# Patient Record
Sex: MALE | Race: WHITE | NOT HISPANIC OR LATINO | Employment: OTHER | URBAN - NONMETROPOLITAN AREA
[De-identification: names, ages, dates, MRNs, and addresses within clinical notes are randomized per-mention and may not be internally consistent; named-entity substitution may affect disease eponyms.]

---

## 2022-08-04 ENCOUNTER — OFFICE VISIT (OUTPATIENT)
Dept: FAMILY MEDICINE CLINIC | Facility: CLINIC | Age: 72
End: 2022-08-04

## 2022-08-04 VITALS
HEART RATE: 71 BPM | TEMPERATURE: 98 F | BODY MASS INDEX: 35.76 KG/M2 | RESPIRATION RATE: 15 BRPM | SYSTOLIC BLOOD PRESSURE: 130 MMHG | OXYGEN SATURATION: 98 % | HEIGHT: 70 IN | DIASTOLIC BLOOD PRESSURE: 72 MMHG | WEIGHT: 249.8 LBS

## 2022-08-04 DIAGNOSIS — Z85.51 HISTORY OF BLADDER CARCINOMA: ICD-10-CM

## 2022-08-04 DIAGNOSIS — Z12.5 PROSTATE CANCER SCREENING: ICD-10-CM

## 2022-08-04 DIAGNOSIS — E78.5 DYSLIPIDEMIA: Chronic | ICD-10-CM

## 2022-08-04 DIAGNOSIS — Z00.00 INITIAL MEDICARE ANNUAL WELLNESS VISIT: Primary | ICD-10-CM

## 2022-08-04 DIAGNOSIS — I10 ESSENTIAL HYPERTENSION: Chronic | ICD-10-CM

## 2022-08-04 PROBLEM — M75.41 IMPINGEMENT SYNDROME OF RIGHT SHOULDER: Status: ACTIVE | Noted: 2022-08-04

## 2022-08-04 PROCEDURE — 1159F MED LIST DOCD IN RCRD: CPT | Performed by: FAMILY MEDICINE

## 2022-08-04 PROCEDURE — 1170F FXNL STATUS ASSESSED: CPT | Performed by: FAMILY MEDICINE

## 2022-08-04 PROCEDURE — 99203 OFFICE O/P NEW LOW 30 MIN: CPT | Performed by: FAMILY MEDICINE

## 2022-08-04 RX ORDER — METOPROLOL SUCCINATE 25 MG/1
TABLET, EXTENDED RELEASE ORAL
COMMUNITY
End: 2022-08-04 | Stop reason: SDUPTHER

## 2022-08-04 RX ORDER — ASPIRIN 81 MG/1
TABLET ORAL
COMMUNITY

## 2022-08-04 RX ORDER — LOSARTAN POTASSIUM 25 MG/1
25 TABLET ORAL DAILY
Qty: 90 TABLET | Refills: 3 | Status: SHIPPED | OUTPATIENT
Start: 2022-08-04

## 2022-08-04 RX ORDER — METOPROLOL SUCCINATE 25 MG/1
25 TABLET, EXTENDED RELEASE ORAL DAILY
Qty: 90 TABLET | Refills: 3 | Status: SHIPPED | OUTPATIENT
Start: 2022-08-04

## 2022-08-04 RX ORDER — SULFAMETHOXAZOLE AND TRIMETHOPRIM 800; 160 MG/1; MG/1
TABLET ORAL
COMMUNITY
Start: 2022-07-19 | End: 2022-08-04

## 2022-08-04 RX ORDER — SIMVASTATIN 10 MG
TABLET ORAL
COMMUNITY
Start: 2012-04-01 | End: 2022-08-04 | Stop reason: SDUPTHER

## 2022-08-04 RX ORDER — SIMVASTATIN 10 MG
10 TABLET ORAL NIGHTLY
Qty: 90 TABLET | Refills: 3 | Status: SHIPPED | OUTPATIENT
Start: 2022-08-04

## 2022-08-04 RX ORDER — LOSARTAN POTASSIUM 25 MG/1
TABLET ORAL
COMMUNITY
End: 2022-08-04 | Stop reason: SDUPTHER

## 2022-08-04 NOTE — PROGRESS NOTES
The ABCs of the Annual Wellness Visit  Initial Medicare Wellness Visit    Chief Complaint   Patient presents with   • Establish Care   • Medicare Wellness-Initial Visit          • Hyperlipidemia   • Hypertension     Subjective   History of Present Illness:  Adolfo Suazo Jr. is a 71 y.o. male who presents for an Initial Medicare Wellness Visit.    Patient also here in request for establishing with a new primary care provider for management of numerous chronic comorbid conditions, including hypertension, dyslipidemia and history of bladder carcinoma.    Patient has lived in [place] for the last several years, chose to live there independently of work.  He is retired.  He is elected to move  to home secondary to be closer to his family, he has a brother that lives in [facility].    Patient reports undergoing bladder cancer surgical intervention, last evaluation approximately 6 months ago.  He denies any hematuria or new urinary symptoms.  He denies fever, chills or night sweats.  He maintains an active lifestyle, balanced dietary intake.  He reports good hydration habits.  He denies chest pain, syncope, palpitations or vertigo.  Denies problems with his current medication regimen.  He has developed a cough with use of ACE inhibitor's in the past.  He has done well with current dosing of losartan.    Patient states he has been just over a year since his last formal laboratory evaluation, perhaps as long as 1-1/2 years.  He is interested in establishing with a local urologist for continued monitoring.    The following portions of the patient's history were reviewed and   updated as appropriate: allergies, current medications, past family history, past medical history, past social history, past surgical history and problem list.     Compared to one year ago, the patient feels his physical   health is the same.    Compared to one year ago, the patient feels his mental   health is the  same.    Recent Hospitalizations:  He was not admitted to the hospital during the last year.       Current Medical Providers:  Patient Care Team:  Sachin Edwards DO as PCP - General (Family Medicine)    Outpatient Medications Prior to Visit   Medication Sig Dispense Refill   • aspirin 81 MG EC tablet aspirin 81 mg tablet,delayed release   Take 1 tablet every day by oral route as directed.     • influenza vac split high-dose (FLUZONE HIGH DOSE) 0.5 ML suspension prefilled syringe injection Fluzone High-Dose 7720-5965 (PF) 180 mcg/0.5 mL intramuscular syringe     • losartan (COZAAR) 25 MG tablet losartan 25 mg tablet   TAKE 1 TABLET BY MOUTH ONCE DAILY     • LOSARTAN POTASSIUM-HCTZ PO      • metoprolol succinate XL (TOPROL-XL) 25 MG 24 hr tablet metoprolol succinate ER 25 mg tablet,extended release 24 hr   TAKE 1 TABLET BY MOUTH ONCE DAILY AT 10 PM     • mupirocin (BACTROBAN) 2 % ointment      • simvastatin (ZOCOR) 10 MG tablet simvastatin 10 mg tablet   TAKE 1 TABLET BY MOUTH ONCE DAILY AT 10 PM     • sulfamethoxazole-trimethoprim (BACTRIM DS,SEPTRA DS) 800-160 MG per tablet        No facility-administered medications prior to visit.       No opioid medication identified on active medication list. I have reviewed chart for other potential  high risk medication/s and harmful drug interactions in the elderly.          Aspirin is on active medication list. Aspirin use is indicated based on review of current medical condition/s. Pros and cons of this therapy have been discussed today. Benefits of this medication outweigh potential harm.  Patient has been encouraged to continue taking this medication.  .      Patient Active Problem List   Diagnosis   • Essential hypertension   • Dyslipidemia   • History of bladder carcinoma   • Impingement syndrome of right shoulder     Advance Care Planning  Advance Directive is not on file.  ACP discussion was held with the patient during this visit. Patient does not have an advance  "directive, information provided.    Review of systems:    1. Constitutional: Negative for fever. Negative for chills, diaphoresis, fatigue and unexpected weight change.   2. HENT: No dysphagia; no changes to vision/hearing/smell/taste; no epistaxis  3. Eyes: Negative for redness and visual disturbance.   4. Respiratory: negative for shortness of breath. Negative for chest pain . Negative for cough and chest tightness.   5. Cardiovascular: Negative for chest pain and palpitations.   6. Gastrointestinal: Negative for abdominal distention, abdominal pain and blood in stool.   7. Endocrine: Negative for cold intolerance and heat intolerance.   8. Genitourinary: Negative for difficulty urinating, dysuria and frequency.   9. Musculoskeletal: Negative for arthralgias, back pain and myalgias.   10. Skin: Negative for color change, rash and wound.   11. Neurological: Negative for syncope, weakness and headaches.   12. Hematological: Negative for adenopathy. Does not bruise/bleed easily.   13. Psychiatric/Behavioral: Negative for confusion. The patient is not nervous/anxious.           Objective       Vitals:    08/04/22 0952   BP: 130/72   Pulse: 71   Resp: 15   Temp: 98 °F (36.7 °C)   SpO2: 98%   Weight: 113 kg (249 lb 12.8 oz)   Height: 177.8 cm (70\")     Estimated body mass index is 35.84 kg/m² as calculated from the following:    Height as of this encounter: 177.8 cm (70\").    Weight as of this encounter: 113 kg (249 lb 12.8 oz).    Class 2 Severe Obesity (BMI >=35 and <=39.9). Obesity-related health conditions include the following: hypertension and dyslipidemias. Obesity is newly identified. BMI is is above average; BMI management plan is completed. We discussed low calorie, low carb based diet program, portion control and increasing exercise.      Does the patient have evidence of cognitive impairment? No    Physical Exam        General Appearance: alert, oriented x 3, no acute distress.  Skin: warm and dry.   HEENT: " Atraumatic.  pupils round and reactive to light and accommodation, oral mucosa pink and moist.  Nares patent without epistaxis.  External auditory canals are patent tympanic membranes intact.  Neck: supple, no JVD, trachea midline.  No thyromegaly  Lungs: CTA, unlabored breathing effort.  Heart: RRR, normal S1 and S2, no S3, no rub.  Abdomen: soft, non-tender, no palpable bladder, present bowel sounds to auscultation ×4.  No guarding or rigidity.  Extremities: no clubbing, cyanosis or edema.  Good range of motion actively and passively.  Symmetric muscle strength and development  Neuro: normal speech and mental status.  Cranial nerves II through XII intact.  No anosmia. DTR 2+; proprioception intact.  No focal motor/sensory deficits.    HEALTH RISK ASSESSMENT    Smoking Status:  Social History     Tobacco Use   Smoking Status Former Smoker   • Packs/day: 1.00   • Years: 17.00   • Pack years: 17.00   • Start date: 1965   • Quit date: 1982   • Years since quittin.9   Smokeless Tobacco Never Used     Alcohol Consumption:  Social History     Substance and Sexual Activity   Alcohol Use Defer     Fall Risk Screen:    STEADI Fall Risk Assessment was completed, and patient is at LOW risk for falls.Assessment completed on:2022    Depression Screen:   PHQ-2/PHQ-9 Depression Screening 2022   Little Interest or Pleasure in Doing Things 0-->not at all   Feeling Down, Depressed or Hopeless 0-->not at all   PHQ-9: Brief Depression Severity Measure Score 0       Health Habits and Functional and Cognitive Screening:  No flowsheet data found.    Age-appropriate Screening Schedule:  Refer to the list below for future screening recommendations based on patient's age, sex and/or medical conditions. Orders for these recommended tests are listed in the plan section. The patient has been provided with a written plan.    Health Maintenance   Topic Date Due   • ZOSTER VACCINE (2 of 2) 2019   • INFLUENZA VACCINE   10/01/2022   • TDAP/TD VACCINES (2 - Td or Tdap) 04/20/2028            Assessment & Plan   CMS Preventative Services Quick Reference  Risk Factors Identified During Encounter  Cardiovascular Disease  Inactivity/Sedentary  Obesity/Overweight   The above risks/problems have been discussed with the patient.  Follow up actions/plans if indicated are seen below in the Assessment/Plan Section.  Pertinent information has been shared with the patient in the After Visit Summary.    Diagnoses and all orders for this visit:    1. Initial Medicare annual wellness visit (Primary)  -     Comprehensive Metabolic Panel  -     CBC & Differential    2. Essential hypertension  -     TSH  -     T4, Free  -     Lipid Panel  -     Comprehensive Metabolic Panel  -     CBC & Differential  -     losartan (COZAAR) 25 MG tablet; Take 1 tablet by mouth Daily.  Dispense: 90 tablet; Refill: 3  -     metoprolol succinate XL (TOPROL-XL) 25 MG 24 hr tablet; Take 1 tablet by mouth Daily.  Dispense: 90 tablet; Refill: 3    3. Dyslipidemia  -     TSH  -     T4, Free  -     Lipid Panel  -     Comprehensive Metabolic Panel  -     simvastatin (ZOCOR) 10 MG tablet; Take 1 tablet by mouth Every Night.  Dispense: 90 tablet; Refill: 3    4. Prostate cancer screening  -     PSA SCREENING    5. History of bladder carcinoma  -     Ambulatory Referral to Urology        Follow Up:  Return in about 6 months (around 2/4/2023) for Recheck.     An After Visit Summary and PPPS were made available to the patient.    I have discussed age/gender specific preventative healthcare issues in detail with patient today.  I have answered all of the questions.    Discussed need for reduction in sodium/salt/caffeine intake; improve sleep habits as able; inc formal CV exercise program with goal of vigorous activity most, if not all, days of the week; goal of 250 min of sustained HR CV exercise total per week.    Referral order has been placed to establish with a new urologist.  He  is relatively asymptomatic at this time.    Surveillance labs today including CMP/CBC/lipid panel/thyroid studies/PSA.    I have reviewed results of most recent colonoscopy, they have been scanned into his chart.  His recommended interval will be 10 years.    I have refilled needed medications today including his losartan, simvastatin and metoprolol.  Continue daily aspirin therapy.    I spent 55 minutes caring for Adolfo on this date of service; this includes 25-minute spent in management of his Medicare wellness issues/concerns, as well as an additional 30 minutes spent in evaluation/management of his hypertension, dyslipidemia, referral for urology in lieu of his bladder carcinoma history. This time includes time spent by me in the following activities:preparing for the visit, performing a medically appropriate examination and/or evaluation , counseling and educating the patient/family/caregiver, ordering medications, tests, or procedures, documenting information in the medical record and care coordination

## 2022-08-04 NOTE — PATIENT INSTRUCTIONS
Medicare Wellness  Personal Prevention Plan of Service     Date of Office Visit:    Encounter Provider:  Sachin Edwards DO  Place of Service:  [facility]  Patient Name: Adolfo Suazo Jr.  :  1950    As part of the Medicare Wellness portion of your visit today, we are providing you with this personalized preventive plan of services (PPPS). This plan is based upon recommendations of the [place] Preventive Services Task Force (USPSTF) and the Advisory Committee on Immunization Practices (ACIP).    This lists the preventive care services that should be considered, and provides dates of when you are due. Items listed as completed are up-to-date and do not require any further intervention.    Health Maintenance   Topic Date Due   • COLORECTAL CANCER SCREENING  Never done   • COVID-19 Vaccine (1) Never done   • Pneumococcal Vaccine 65+ (1 - PCV) Never done   • ZOSTER VACCINE (2 of 2) 2019   • HEPATITIS C SCREENING  Never done   • ANNUAL WELLNESS VISIT  Never done   • AAA SCREEN (ONE-TIME)  Never done   • INFLUENZA VACCINE  10/01/2022   • TDAP/TD VACCINES (2 - Td or Tdap) 2028       No orders of the defined types were placed in this encounter.      No follow-ups on file.

## 2022-08-06 LAB
ALBUMIN SERPL-MCNC: 4.1 G/DL (ref 3.5–5.2)
ALBUMIN/GLOB SERPL: 2.7 G/DL
ALP SERPL-CCNC: 73 U/L (ref 39–117)
ALT SERPL-CCNC: 25 U/L (ref 1–41)
AST SERPL-CCNC: 24 U/L (ref 1–40)
BASOPHILS # BLD AUTO: 0.09 10*3/MM3 (ref 0–0.2)
BASOPHILS NFR BLD AUTO: 1.3 % (ref 0–1.5)
BILIRUB SERPL-MCNC: 0.6 MG/DL (ref 0–1.2)
BUN SERPL-MCNC: 11 MG/DL (ref 8–23)
BUN/CREAT SERPL: 12.1 (ref 7–25)
CALCIUM SERPL-MCNC: 9.1 MG/DL (ref 8.6–10.5)
CHLORIDE SERPL-SCNC: 105 MMOL/L (ref 98–107)
CHOLEST SERPL-MCNC: 153 MG/DL (ref 0–200)
CO2 SERPL-SCNC: 24.2 MMOL/L (ref 22–29)
CREAT SERPL-MCNC: 0.91 MG/DL (ref 0.76–1.27)
EGFRCR SERPLBLD CKD-EPI 2021: 89.5 ML/MIN/1.73
EOSINOPHIL # BLD AUTO: 0.27 10*3/MM3 (ref 0–0.4)
EOSINOPHIL NFR BLD AUTO: 3.8 % (ref 0.3–6.2)
ERYTHROCYTE [DISTWIDTH] IN BLOOD BY AUTOMATED COUNT: 12.5 % (ref 12.3–15.4)
GLOBULIN SER CALC-MCNC: 1.5 GM/DL
GLUCOSE SERPL-MCNC: 90 MG/DL (ref 65–99)
HCT VFR BLD AUTO: 44.3 % (ref 37.5–51)
HDLC SERPL-MCNC: 39 MG/DL (ref 40–60)
HGB BLD-MCNC: 14.9 G/DL (ref 13–17.7)
IMM GRANULOCYTES # BLD AUTO: 0.02 10*3/MM3 (ref 0–0.05)
IMM GRANULOCYTES NFR BLD AUTO: 0.3 % (ref 0–0.5)
LDLC SERPL CALC-MCNC: 96 MG/DL (ref 0–100)
LYMPHOCYTES # BLD AUTO: 3.04 10*3/MM3 (ref 0.7–3.1)
LYMPHOCYTES NFR BLD AUTO: 42.6 % (ref 19.6–45.3)
MCH RBC QN AUTO: 31.9 PG (ref 26.6–33)
MCHC RBC AUTO-ENTMCNC: 33.6 G/DL (ref 31.5–35.7)
MCV RBC AUTO: 94.9 FL (ref 79–97)
MONOCYTES # BLD AUTO: 0.57 10*3/MM3 (ref 0.1–0.9)
MONOCYTES NFR BLD AUTO: 8 % (ref 5–12)
NEUTROPHILS # BLD AUTO: 3.15 10*3/MM3 (ref 1.7–7)
NEUTROPHILS NFR BLD AUTO: 44 % (ref 42.7–76)
NRBC BLD AUTO-RTO: 0.1 /100 WBC (ref 0–0.2)
PLATELET # BLD AUTO: 200 10*3/MM3 (ref 140–450)
POTASSIUM SERPL-SCNC: 4.8 MMOL/L (ref 3.5–5.2)
PROT SERPL-MCNC: 5.6 G/DL (ref 6–8.5)
PSA SERPL-MCNC: 2 NG/ML (ref 0–4)
RBC # BLD AUTO: 4.67 10*6/MM3 (ref 4.14–5.8)
SODIUM SERPL-SCNC: 139 MMOL/L (ref 136–145)
T4 FREE SERPL-MCNC: 1.35 NG/DL (ref 0.93–1.7)
TRIGL SERPL-MCNC: 98 MG/DL (ref 0–150)
TSH SERPL DL<=0.005 MIU/L-ACNC: 1.68 UIU/ML (ref 0.27–4.2)
VLDLC SERPL CALC-MCNC: 18 MG/DL (ref 5–40)
WBC # BLD AUTO: 7.14 10*3/MM3 (ref 3.4–10.8)

## 2022-09-19 ENCOUNTER — TELEPHONE (OUTPATIENT)
Dept: FAMILY MEDICINE CLINIC | Facility: CLINIC | Age: 72
End: 2022-09-19

## 2022-09-19 NOTE — TELEPHONE ENCOUNTER
PT CALLED TO CHECK ON RX CHANTIX, STATED THAT RX HAS NOT BEEN SENT TO PHARMACY YET.    PLEASE ADVISE.  CALL BACK:[phone]    [facility] [address] [facility], [place] - [address] [facility] [phone] [facility] [phone] FX

## 2022-09-20 NOTE — TELEPHONE ENCOUNTER
Caller: Adolfo Suazo Jr.    Relationship: Self    Best call back number: [phone]    What medication are you requesting: CHANTIX     What are your current symptoms: HELP QUIT SMOKING     How long have you been experiencing symptoms: STARTING SMOKING AGAIN FOR 3 MONTHS     Have you had these symptoms before:    [x] Yes  [] No    Have you been treated for these symptoms before:   [x] Yes  [] No    If a prescription is needed, what is your preferred pharmacy and phone number: [facility] [address] [place] [address] [phone] [facility] [phone]      Additional notes: PATIENT STATES HE HAS USED CHANTIX IN THE PAST AND IT WORKED REALLY WELL FOR HIM.

## 2022-09-23 ENCOUNTER — TELEPHONE (OUTPATIENT)
Dept: FAMILY MEDICINE CLINIC | Facility: CLINIC | Age: 72
End: 2022-09-23

## 2022-09-23 NOTE — TELEPHONE ENCOUNTER
Caller: Adolfo Suazo Jr.    Relationship: Self    Best call back number:     [phone]    What medication are you requesting:     CHANTIX (1) MG     What are your current symptoms:     SMOKING CESSATION    If a prescription is needed, what is your preferred pharmacy and phone number:      [facility] - [place], [place]    TELEPHONE CONTACT:    [phone]    DR HAHN

## 2022-09-26 RX ORDER — VARENICLINE TARTRATE 1 MG/1
1 TABLET, FILM COATED ORAL 2 TIMES DAILY
Qty: 56 TABLET | Refills: 1 | Status: SHIPPED | OUTPATIENT
Start: 2022-10-24 | End: 2022-12-19

## 2022-09-29 ENCOUNTER — OFFICE VISIT (OUTPATIENT)
Dept: UROLOGY | Facility: CLINIC | Age: 72
End: 2022-09-29

## 2022-09-29 VITALS
SYSTOLIC BLOOD PRESSURE: 124 MMHG | DIASTOLIC BLOOD PRESSURE: 84 MMHG | OXYGEN SATURATION: 96 % | HEIGHT: 70 IN | BODY MASS INDEX: 35.79 KG/M2 | WEIGHT: 250 LBS | TEMPERATURE: 97.6 F | HEART RATE: 59 BPM

## 2022-09-29 DIAGNOSIS — Z85.51 HISTORY OF BLADDER CANCER: Primary | ICD-10-CM

## 2022-09-29 LAB
BILIRUB BLD-MCNC: NEGATIVE MG/DL
CLARITY, POC: CLEAR
COLOR UR: YELLOW
EXPIRATION DATE: NORMAL
GLUCOSE UR STRIP-MCNC: NEGATIVE MG/DL
KETONES UR QL: NEGATIVE
LEUKOCYTE EST, POC: NEGATIVE
Lab: NORMAL
NITRITE UR-MCNC: NEGATIVE MG/ML
PH UR: 5.5 [PH] (ref 5–8)
PROT UR STRIP-MCNC: NEGATIVE MG/DL
RBC # UR STRIP: NEGATIVE /UL
SP GR UR: 1.01 (ref 1–1.03)
UROBILINOGEN UR QL: NORMAL

## 2022-09-29 PROCEDURE — 99203 OFFICE O/P NEW LOW 30 MIN: CPT | Performed by: UROLOGY

## 2022-09-29 PROCEDURE — 81003 URINALYSIS AUTO W/O SCOPE: CPT | Performed by: UROLOGY

## 2022-09-29 NOTE — PROGRESS NOTES
Chief Complaint  Chief Complaint   Patient presents with   • Hx of Bladder Cancer     New patient      Referring Provider:  Sachin Edwards DO    HPI  Mr. Suazo is a 71 y.o. male with below past medical history who presents with history of low-grade TA urothelial carcinoma, diagnosed with TURBT 2021.    No recurrence since then. No recent gross hematuria.     Past Medical History  Past Medical History:   Diagnosis Date   • Arthritis        Past Surgical History  Past Surgical History:   Procedure Laterality Date   • BLADDER SURGERY     • HERNIA REPAIR         Medications    Current Outpatient Medications:   •  aspirin 81 MG EC tablet, aspirin 81 mg tablet,delayed release  Take 1 tablet every day by oral route as directed., Disp: , Rfl:   •  losartan (COZAAR) 25 MG tablet, Take 1 tablet by mouth Daily., Disp: 90 tablet, Rfl: 3  •  metoprolol succinate XL (TOPROL-XL) 25 MG 24 hr tablet, Take 1 tablet by mouth Daily., Disp: 90 tablet, Rfl: 3  •  simvastatin (ZOCOR) 10 MG tablet, Take 1 tablet by mouth Every Night., Disp: 90 tablet, Rfl: 3  •  [START ON 10/24/2022] varenicline (Chantix Continuing Month Anoop) 1 MG tablet, Take 1 tablet by mouth 2 (Two) Times a Day for 56 days., Disp: 56 tablet, Rfl: 1  •  varenicline (CHANTIX ANOOP) 0.5 MG X 11 & 1 MG X 42 tablet, Take 0.5 mg po daily x 3 days, then 0.5 mg po bid x 4 days, then 1 mg po bid, Disp: 53 tablet, Rfl: 0  •  influenza vac split high-dose (FLUZONE HIGH DOSE) 0.5 ML suspension prefilled syringe injection, Fluzone High-Dose 9230-4681 (PF) 180 mcg/0.5 mL intramuscular syringe, Disp: , Rfl:     Allergies  No Known Allergies    Social History  Social History     Socioeconomic History   • Marital status: Single   Tobacco Use   • Smoking status: Former Smoker     Packs/day: 1.00     Years: 17.00     Pack years: 17.00     Start date: 1965     Quit date: 1982     Years since quittin.1   • Smokeless tobacco: Never Used   Vaping Use   • Vaping Use:  "Never used   Substance and Sexual Activity   • Alcohol use: Never   • Drug use: Never   • Sexual activity: Defer       Family History  Family History   Problem Relation Age of Onset   • Hypertension Mother    • Heart disease Mother    • Diabetes Father    • Hypertension Father    • Heart disease Father        Review of Systems  Review of systems was notable for 20 lb weight gain, nocturia x2.    Physical Exam  Visit Vitals  /84 (BP Location: Right arm, Patient Position: Sitting, Cuff Size: Adult)   Pulse 59   Temp 97.6 °F (36.4 °C) (Temporal)   Ht 177.8 cm (70\")   Wt 113 kg (250 lb)   SpO2 96%   BMI 35.87 kg/m²     Physical exam was performed and was notable for obese    Genitourinary  deferred    Labs  Lab Results   Component Value Date    PSA 2.000 08/05/2022       Lab Results   Component Value Date    GLUCOSE 90 08/05/2022    CALCIUM 9.1 08/05/2022     08/05/2022    K 4.8 08/05/2022    CO2 24.2 08/05/2022     08/05/2022    BUN 11 08/05/2022    CREATININE 0.91 08/05/2022    BCR 12.1 08/05/2022       Lab Results   Component Value Date    WBC 7.14 08/05/2022    HGB 14.9 08/05/2022    HCT 44.3 08/05/2022    MCV 94.9 08/05/2022     08/05/2022       Brief Urine Lab Results     None           Radiologic Studies  No Images in the past 120 days found..        Assessment  Mr. Suazo is a 71 y.o. male who presents with history of low-grade noninvasive urothelial carcinoma, last TURBT 2/25/2021.  No current bothersome lower urinary tract symptoms and no recent gross hematuria.    Plan  1.  Schedule cystoscopy.  Per AUA guidelines I would recommend yearly cystoscopy for up to 5 years, barring any recurrences.      Trey Lam MD    "

## 2022-10-26 ENCOUNTER — HOSPITAL ENCOUNTER (EMERGENCY)
Facility: HOSPITAL | Age: 72
Discharge: HOME | End: 2022-10-26
Attending: FAMILY MEDICINE | Admitting: EMERGENCY MEDICINE

## 2022-10-26 ENCOUNTER — APPOINTMENT (OUTPATIENT)
Dept: GENERAL RADIOLOGY | Facility: HOSPITAL | Age: 72
End: 2022-10-26

## 2022-10-26 VITALS
BODY MASS INDEX: 35.79 KG/M2 | HEART RATE: 51 BPM | HEIGHT: 70 IN | RESPIRATION RATE: 18 BRPM | OXYGEN SATURATION: 96 % | DIASTOLIC BLOOD PRESSURE: 87 MMHG | SYSTOLIC BLOOD PRESSURE: 144 MMHG | WEIGHT: 250 LBS | TEMPERATURE: 98.6 F

## 2022-10-26 DIAGNOSIS — R00.2 PALPITATIONS: Primary | ICD-10-CM

## 2022-10-26 LAB
ALBUMIN SERPL-MCNC: 3.7 G/DL (ref 3.5–5.2)
ALBUMIN/GLOB SERPL: 1.8 G/DL
ALP SERPL-CCNC: 79 U/L (ref 39–117)
ALT SERPL W P-5'-P-CCNC: 19 U/L (ref 1–41)
ANION GAP SERPL CALCULATED.3IONS-SCNC: 6.8 MMOL/L (ref 5–15)
AST SERPL-CCNC: 19 U/L (ref 1–40)
BASOPHILS # BLD AUTO: 0.08 10*3/MM3 (ref 0–0.2)
BASOPHILS NFR BLD AUTO: 1 % (ref 0–1.5)
BILIRUB SERPL-MCNC: 0.7 MG/DL (ref 0–1.2)
BUN SERPL-MCNC: 14 MG/DL (ref 8–23)
BUN/CREAT SERPL: 14.1 (ref 7–25)
CALCIUM SPEC-SCNC: 9.1 MG/DL (ref 8.6–10.5)
CHLORIDE SERPL-SCNC: 104 MMOL/L (ref 98–107)
CO2 SERPL-SCNC: 27.2 MMOL/L (ref 22–29)
CREAT SERPL-MCNC: 0.99 MG/DL (ref 0.76–1.27)
D DIMER PPP FEU-MCNC: <0.27 MCGFEU/ML (ref 0–0.57)
DEPRECATED RDW RBC AUTO: 41.1 FL (ref 37–54)
EGFRCR SERPLBLD CKD-EPI 2021: 80.9 ML/MIN/1.73
EOSINOPHIL # BLD AUTO: 0.29 10*3/MM3 (ref 0–0.4)
EOSINOPHIL NFR BLD AUTO: 3.7 % (ref 0.3–6.2)
ERYTHROCYTE [DISTWIDTH] IN BLOOD BY AUTOMATED COUNT: 11.9 % (ref 12.3–15.4)
FLUAV RNA RESP QL NAA+PROBE: NOT DETECTED
FLUBV RNA RESP QL NAA+PROBE: NOT DETECTED
GLOBULIN UR ELPH-MCNC: 2.1 GM/DL
GLUCOSE SERPL-MCNC: 146 MG/DL (ref 65–99)
HCT VFR BLD AUTO: 44.1 % (ref 37.5–51)
HGB BLD-MCNC: 14.9 G/DL (ref 13–17.7)
HOLD SPECIMEN: NORMAL
HOLD SPECIMEN: NORMAL
IMM GRANULOCYTES # BLD AUTO: 0.03 10*3/MM3 (ref 0–0.05)
IMM GRANULOCYTES NFR BLD AUTO: 0.4 % (ref 0–0.5)
LYMPHOCYTES # BLD AUTO: 3.34 10*3/MM3 (ref 0.7–3.1)
LYMPHOCYTES NFR BLD AUTO: 42.2 % (ref 19.6–45.3)
MAGNESIUM SERPL-MCNC: 2.1 MG/DL (ref 1.6–2.4)
MCH RBC QN AUTO: 31.4 PG (ref 26.6–33)
MCHC RBC AUTO-ENTMCNC: 33.8 G/DL (ref 31.5–35.7)
MCV RBC AUTO: 93 FL (ref 79–97)
MONOCYTES # BLD AUTO: 0.45 10*3/MM3 (ref 0.1–0.9)
MONOCYTES NFR BLD AUTO: 5.7 % (ref 5–12)
NEUTROPHILS NFR BLD AUTO: 3.72 10*3/MM3 (ref 1.7–7)
NEUTROPHILS NFR BLD AUTO: 47 % (ref 42.7–76)
NRBC BLD AUTO-RTO: 0 /100 WBC (ref 0–0.2)
NT-PROBNP SERPL-MCNC: 75.7 PG/ML (ref 0–900)
PLATELET # BLD AUTO: 184 10*3/MM3 (ref 140–450)
PMV BLD AUTO: 11.1 FL (ref 6–12)
POTASSIUM SERPL-SCNC: 3.6 MMOL/L (ref 3.5–5.2)
PROT SERPL-MCNC: 5.8 G/DL (ref 6–8.5)
RBC # BLD AUTO: 4.74 10*6/MM3 (ref 4.14–5.8)
SARS-COV-2 RNA RESP QL NAA+PROBE: NOT DETECTED
SODIUM SERPL-SCNC: 138 MMOL/L (ref 136–145)
TROPONIN T SERPL-MCNC: <0.01 NG/ML (ref 0–0.03)
TROPONIN T SERPL-MCNC: <0.01 NG/ML (ref 0–0.03)
TSH SERPL DL<=0.05 MIU/L-ACNC: 1.57 UIU/ML (ref 0.27–4.2)
WBC NRBC COR # BLD: 7.91 10*3/MM3 (ref 3.4–10.8)
WHOLE BLOOD HOLD COAG: NORMAL
WHOLE BLOOD HOLD SPECIMEN: NORMAL

## 2022-10-26 PROCEDURE — 99284 EMERGENCY DEPT VISIT MOD MDM: CPT

## 2022-10-26 PROCEDURE — 83735 ASSAY OF MAGNESIUM: CPT | Performed by: FAMILY MEDICINE

## 2022-10-26 PROCEDURE — 93005 ELECTROCARDIOGRAM TRACING: CPT | Performed by: FAMILY MEDICINE

## 2022-10-26 PROCEDURE — 87636 SARSCOV2 & INF A&B AMP PRB: CPT | Performed by: FAMILY MEDICINE

## 2022-10-26 PROCEDURE — 83880 ASSAY OF NATRIURETIC PEPTIDE: CPT | Performed by: FAMILY MEDICINE

## 2022-10-26 PROCEDURE — 85379 FIBRIN DEGRADATION QUANT: CPT | Performed by: FAMILY MEDICINE

## 2022-10-26 PROCEDURE — 71045 X-RAY EXAM CHEST 1 VIEW: CPT

## 2022-10-26 PROCEDURE — 36415 COLL VENOUS BLD VENIPUNCTURE: CPT

## 2022-10-26 PROCEDURE — 85025 COMPLETE CBC W/AUTO DIFF WBC: CPT | Performed by: FAMILY MEDICINE

## 2022-10-26 PROCEDURE — 80053 COMPREHEN METABOLIC PANEL: CPT | Performed by: FAMILY MEDICINE

## 2022-10-26 PROCEDURE — 84443 ASSAY THYROID STIM HORMONE: CPT | Performed by: FAMILY MEDICINE

## 2022-10-26 PROCEDURE — 84484 ASSAY OF TROPONIN QUANT: CPT | Performed by: FAMILY MEDICINE

## 2022-10-26 RX ORDER — SODIUM CHLORIDE 0.9 % (FLUSH) 0.9 %
10 SYRINGE (ML) INJECTION AS NEEDED
Status: DISCONTINUED | OUTPATIENT
Start: 2022-10-26 | End: 2022-10-26 | Stop reason: HOSPADM

## 2022-10-28 ENCOUNTER — TELEPHONE (OUTPATIENT)
Dept: FAMILY MEDICINE CLINIC | Facility: CLINIC | Age: 72
End: 2022-10-28

## 2022-10-28 PROBLEM — Z87.898 HISTORY OF SYNCOPE: Status: ACTIVE | Noted: 2018-11-30

## 2022-10-28 PROBLEM — F17.290 CIGAR SMOKER: Status: ACTIVE | Noted: 2021-02-09

## 2022-10-28 PROBLEM — H93.11 TINNITUS OF RIGHT EAR: Status: ACTIVE | Noted: 2021-09-29

## 2022-10-28 PROBLEM — I70.0 HARDENING OF THE AORTA (MAIN ARTERY OF THE HEART): Status: ACTIVE | Noted: 2020-06-25

## 2022-10-28 PROBLEM — R07.89 ATYPICAL CHEST PAIN: Status: ACTIVE | Noted: 2020-06-25

## 2022-10-28 PROBLEM — L29.9 PRURITIC DISORDER: Status: ACTIVE | Noted: 2018-04-20

## 2022-10-28 PROBLEM — I47.10 PAROXYSMAL SUPRAVENTRICULAR TACHYCARDIA: Status: ACTIVE | Noted: 2019-08-28

## 2022-10-28 PROBLEM — M89.9 DISORDER OF BONE: Status: ACTIVE | Noted: 2018-08-07

## 2022-10-28 PROBLEM — E66.9 OBESITY WITH BODY MASS INDEX 30 OR GREATER: Status: ACTIVE | Noted: 2018-04-20

## 2022-10-28 PROBLEM — C68.8: Status: ACTIVE | Noted: 2021-02-24

## 2022-10-28 PROBLEM — I47.1 PAROXYSMAL SUPRAVENTRICULAR TACHYCARDIA: Status: ACTIVE | Noted: 2019-08-28

## 2022-10-28 PROBLEM — E66.9 OBESITY: Status: ACTIVE | Noted: 2021-02-09

## 2022-10-28 PROBLEM — R09.89 LABILE BLOOD PRESSURE: Status: ACTIVE | Noted: 2020-06-25

## 2022-10-28 PROBLEM — M65.939 EXTENSOR TENOSYNOVITIS OF WRIST: Status: ACTIVE | Noted: 2022-04-12

## 2022-10-28 PROBLEM — F41.9 CHRONIC ANXIETY: Status: ACTIVE | Noted: 2018-04-20

## 2022-10-28 PROBLEM — R55 SYNCOPE: Status: ACTIVE | Noted: 2018-05-22

## 2022-10-28 PROBLEM — M62.830 SPASM OF THORACIC BACK MUSCLE: Status: ACTIVE | Noted: 2021-04-29

## 2022-10-28 PROBLEM — R45.89 ANXIETY ABOUT HEALTH: Status: ACTIVE | Noted: 2018-11-30

## 2022-10-28 PROBLEM — M65.839 EXTENSOR TENOSYNOVITIS OF WRIST: Status: ACTIVE | Noted: 2022-04-12

## 2022-10-28 PROBLEM — K21.00 GASTRO-ESOPHAGEAL REFLUX DISEASE WITH ESOPHAGITIS: Status: ACTIVE | Noted: 2021-09-29

## 2022-10-28 PROBLEM — N13.8 BENIGN PROSTATIC HYPERPLASIA WITH URINARY OBSTRUCTION: Status: ACTIVE | Noted: 2018-08-07

## 2022-10-28 PROBLEM — I51.7 LEFT VENTRICULAR HYPERTROPHY: Status: ACTIVE | Noted: 2018-05-22

## 2022-10-28 PROBLEM — I10 ESSENTIAL HYPERTENSION: Status: ACTIVE | Noted: 2018-04-20

## 2022-10-28 PROBLEM — L81.4 SOLAR LENTIGO: Status: ACTIVE | Noted: 2018-11-30

## 2022-10-28 PROBLEM — E78.2 MIXED HYPERLIPIDEMIA: Status: ACTIVE | Noted: 2018-04-20

## 2022-10-28 PROBLEM — M25.562 PAIN IN LEFT KNEE: Status: ACTIVE | Noted: 2019-05-28

## 2022-10-28 PROBLEM — F41.8 ANXIETY ABOUT HEALTH: Status: ACTIVE | Noted: 2018-11-30

## 2022-10-28 PROBLEM — N40.1 BENIGN PROSTATIC HYPERPLASIA WITH URINARY OBSTRUCTION: Status: ACTIVE | Noted: 2018-08-07

## 2022-11-22 ENCOUNTER — OFFICE VISIT (OUTPATIENT)
Dept: FAMILY MEDICINE CLINIC | Facility: CLINIC | Age: 72
End: 2022-11-22

## 2022-11-22 VITALS
HEIGHT: 70 IN | WEIGHT: 263.6 LBS | OXYGEN SATURATION: 98 % | TEMPERATURE: 97.4 F | HEART RATE: 71 BPM | SYSTOLIC BLOOD PRESSURE: 126 MMHG | DIASTOLIC BLOOD PRESSURE: 74 MMHG | RESPIRATION RATE: 16 BRPM | BODY MASS INDEX: 37.74 KG/M2

## 2022-11-22 DIAGNOSIS — H66.001 NON-RECURRENT ACUTE SUPPURATIVE OTITIS MEDIA OF RIGHT EAR WITHOUT SPONTANEOUS RUPTURE OF TYMPANIC MEMBRANE: Primary | ICD-10-CM

## 2022-11-22 DIAGNOSIS — R59.0 LYMPHADENOPATHY, PERIAURICULAR: ICD-10-CM

## 2022-11-22 PROCEDURE — 99213 OFFICE O/P EST LOW 20 MIN: CPT | Performed by: NURSE PRACTITIONER

## 2022-11-22 RX ORDER — AMOXICILLIN 500 MG/1
1000 CAPSULE ORAL 2 TIMES DAILY
Qty: 28 CAPSULE | Refills: 0 | Status: SHIPPED | OUTPATIENT
Start: 2022-11-22 | End: 2022-11-29

## 2022-11-22 NOTE — PROGRESS NOTES
"                      Established Patient        Chief Complaint:   Chief Complaint   Patient presents with   • Earache     JAW PAIN          History of Present Illness:    Adolfo Suazo Jr. is a 72 y.o. male who presents today for complaints of right sided jaw and ear pain. Patient reports that ear pain started about 1 month ago as a \"fullness\" and a \"hollowness\" to hearing. Patient reports that symptoms subsided and returned about 2 days ago with associated jaw pain.     Subjective     The following portions of the patient's history were reviewed and updated as appropriate: allergies, current medications, past family history, past medical history, past social history, past surgical history and problem list.    ALLERGIES  No Known Allergies    ROS  Review of Systems   Constitutional: Negative for fever.   HENT: Positive for dental problem (right jaw pain) and ear pain (right). Negative for congestion and sore throat.    Respiratory: Negative for cough.    Neurological: Negative for headaches.   All other systems reviewed and are negative.    Objective     Vital Signs:   /74   Pulse 71   Temp 97.4 °F (36.3 °C)   Resp 16   Ht 177.8 cm (70\")   Wt 120 kg (263 lb 9.6 oz)   SpO2 98%   BMI 37.82 kg/m²     Physical Exam   Physical Exam  Vitals and nursing note reviewed.   HENT:      Head: Normocephalic.      Right Ear: Tympanic membrane is injected, erythematous and bulging. Tympanic membrane is not perforated.      Left Ear: Tympanic membrane normal.      Nose: Nose normal.   Cardiovascular:      Rate and Rhythm: Normal rate.      Heart sounds: Normal heart sounds.   Pulmonary:      Effort: Pulmonary effort is normal.      Breath sounds: Normal breath sounds.   Lymphadenopathy:      Head:      Right side of head: Submandibular, preauricular and posterior auricular adenopathy present.   Neurological:      Mental Status: He is alert and oriented to person, place, and time.   Psychiatric:         Mood and " Affect: Mood normal.         Behavior: Behavior normal.       Assessment and Plan      Assessment/Plan:   Diagnoses and all orders for this visit:    1. Non-recurrent acute suppurative otitis media of right ear without spontaneous rupture of tympanic membrane (Primary)  -     amoxicillin (AMOXIL) 500 MG capsule; Take 2 capsules by mouth 2 (Two) Times a Day for 7 days.  Dispense: 28 capsule; Refill: 0    2. Lymphadenopathy, periauricular      Discussion Summary:  Discussed plan of care in detail with pt today; pt verb understanding and agrees.    Follow up:  No follow-ups on file.     Patient Education:  There are no Patient Instructions on file for this visit.    Flora Rodriguez, LYNSEY  11/28/22  16:30 EST          Please note that portions of this note may have been completed with a voice recognition program.

## 2022-11-28 ENCOUNTER — PROCEDURE VISIT (OUTPATIENT)
Dept: UROLOGY | Facility: CLINIC | Age: 72
End: 2022-11-28

## 2022-11-28 DIAGNOSIS — C68.8 PRIMARY UROTHELIAL CARCINOMA OF OVERLAPPING SITES OF URINARY ORGANS: Primary | ICD-10-CM

## 2022-11-28 PROCEDURE — 52000 CYSTOURETHROSCOPY: CPT | Performed by: UROLOGY

## 2022-11-28 NOTE — PROGRESS NOTES
Preprocedure diagnosis  History of urothelial carcinoma    Postprocedure diagnosis  No recurrence is identified    Procedure  Flexible Cystourethroscopy    Attending surgeon  Trey Lam MD    Anesthesia  2% lidocaine jelly intraurethrally    Complications  None    Indications  72 y.o. male undergoing a flexible cystoscopy for the above mentioned indications.  Informed consent was obtained.      Findings  Cystoscopic findings included one right and left ureteral orifice in the normal anatomic position with normal bladder mucosa and no tumors, masses or stones. The urethral urothelium was within normal limits with no strictures.  There was not a prominent median lobe.  The lateral lobes were mildly obstructive in appearance.  There was some distal regrowth of his prostate after his prior TURP.    Procedure  The patient was placed in supine position and prepped and draped in sterile fashion with lidocaine jelly per urethra for anesthesia.  A timeout was performed.  The 14F flexible cystoscope was lubricated and gently placed through the penile urethra and into the bladder.  The bladder was completely visualized.  The cystoscope was retroflexed and the bladder neck and prostate visualized.  The cystoscope was slowly withdrawn while visualizing the urethra and the procedure terminated.  The patient tolerated the procedure well.      72 y.o. male who presents with history of low-grade noninvasive urothelial carcinoma, last TURBT 2/25/2021.  No current bothersome lower urinary tract symptoms and no recent gross hematuria.    Plan  1.    Per AUA guidelines I would recommend yearly cystoscopy for up to 5 years, barring any recurrences.

## 2023-02-06 ENCOUNTER — OFFICE VISIT (OUTPATIENT)
Dept: FAMILY MEDICINE CLINIC | Facility: CLINIC | Age: 73
End: 2023-02-06
Payer: MEDICARE

## 2023-02-06 VITALS
OXYGEN SATURATION: 97 % | HEART RATE: 61 BPM | DIASTOLIC BLOOD PRESSURE: 84 MMHG | HEIGHT: 70 IN | SYSTOLIC BLOOD PRESSURE: 124 MMHG | BODY MASS INDEX: 38.65 KG/M2 | TEMPERATURE: 97.8 F | WEIGHT: 270 LBS

## 2023-02-06 DIAGNOSIS — E78.5 DYSLIPIDEMIA: Chronic | ICD-10-CM

## 2023-02-06 DIAGNOSIS — I47.1 PAROXYSMAL SUPRAVENTRICULAR TACHYCARDIA: ICD-10-CM

## 2023-02-06 DIAGNOSIS — I10 ESSENTIAL HYPERTENSION: Primary | Chronic | ICD-10-CM

## 2023-02-06 DIAGNOSIS — K21.9 GERD WITHOUT ESOPHAGITIS: Chronic | ICD-10-CM

## 2023-02-06 DIAGNOSIS — Z85.51 HISTORY OF BLADDER CARCINOMA: ICD-10-CM

## 2023-02-06 DIAGNOSIS — Z12.83 SKIN EXAM, SCREENING FOR CANCER: ICD-10-CM

## 2023-02-06 PROBLEM — N40.1 BPH WITH OBSTRUCTION/LOWER URINARY TRACT SYMPTOMS: Chronic | Status: ACTIVE | Noted: 2018-08-07

## 2023-02-06 PROBLEM — N13.8 BPH WITH OBSTRUCTION/LOWER URINARY TRACT SYMPTOMS: Chronic | Status: ACTIVE | Noted: 2018-08-07

## 2023-02-06 PROCEDURE — 99214 OFFICE O/P EST MOD 30 MIN: CPT | Performed by: FAMILY MEDICINE

## 2023-04-13 ENCOUNTER — OFFICE VISIT (OUTPATIENT)
Dept: FAMILY MEDICINE CLINIC | Facility: CLINIC | Age: 73
End: 2023-04-13
Payer: MEDICARE

## 2023-04-13 VITALS
WEIGHT: 258 LBS | TEMPERATURE: 97 F | DIASTOLIC BLOOD PRESSURE: 76 MMHG | OXYGEN SATURATION: 99 % | RESPIRATION RATE: 16 BRPM | SYSTOLIC BLOOD PRESSURE: 138 MMHG | HEART RATE: 64 BPM | HEIGHT: 70 IN | BODY MASS INDEX: 36.94 KG/M2

## 2023-04-13 DIAGNOSIS — L57.0 ADVANCED ACTINIC KERATOSIS: ICD-10-CM

## 2023-04-13 DIAGNOSIS — M54.32 LEFT SIDED SCIATICA: ICD-10-CM

## 2023-04-13 DIAGNOSIS — M25.811 IMPINGEMENT OF RIGHT SHOULDER: Primary | ICD-10-CM

## 2023-04-13 PROCEDURE — 99214 OFFICE O/P EST MOD 30 MIN: CPT | Performed by: FAMILY MEDICINE

## 2023-04-13 NOTE — PROGRESS NOTES
Established Patient        Chief Complaint:   Chief Complaint   Patient presents with   • Medication Problem     Second opinion on medicated ointment        Adolfo Suazo Jr. is a 72 y.o. male    History of Present Illness:   Answers for HPI/ROS submitted by the patient on 4/6/2023  What is the primary reason for your visit?: Other  How long have you been having these symptoms?: Greater than 2 weeks  Please list any medications you are currently taking for this condition.: n/a  Please describe any probable cause for these symptoms. : n/a    Here for evaluation of recently diagnosed advanced actinic keratosis of the face, evaluated by dermatologist.  Recommended 5-fluorouracil wash.  Patient is interested in his primary care physician's opinion on use of treatment.    He also complains of right shoulder discomfort, describes none trauma related development, worsened with lying on his side.  He is predominantly a right sided side sleeper.  Impedes his ability to perform certain ADLs, particularly grooming, is well as other aspects of his life.        Subjective     The following portions of the patient's history were reviewed and updated as appropriate: allergies, current medications, past family history, past medical history, past social history, past surgical history and problem list.    No Known Allergies    Review of Systems  1. Constitutional: Negative for fever. Negative for chills, diaphoresis, fatigue and unexpected weight change.   2. HENT: No dysphagia; no changes to vision/hearing/smell/taste; no epistaxis  3. Eyes: Negative for redness and visual disturbance.   4. Respiratory: negative for shortness of breath. Negative for chest pain . Negative for cough and chest tightness.   5. Cardiovascular: Negative for chest pain and palpitations.   6. Gastrointestinal: Negative for abdominal distention, abdominal pain and blood in stool.   7. Endocrine: Negative for cold intolerance and heat  "intolerance.   8. Genitourinary: Negative for difficulty urinating, dysuria and frequency.   9. Musculoskeletal: Left shoulder arthralgia, lumbar back pain and myalgias.   10. Skin: Negative for color change, rash and wound.   11. Neurological: Negative for syncope, weakness and headaches.   12. Hematological: Negative for adenopathy. Does not bruise/bleed easily.   13. Psychiatric/Behavioral: Negative for confusion. The patient is not nervous/anxious.    Objective     Physical Exam   Vital Signs: /76   Pulse 64   Temp 97 °F (36.1 °C)   Resp 16   Ht 177.8 cm (70\")   Wt 117 kg (258 lb)   SpO2 99%   BMI 37.02 kg/m²   Class 2 Severe Obesity (BMI >=35 and <=39.9). Obesity-related health conditions include the following: hypertension, dyslipidemias, GERD and osteoarthritis. Obesity is worsening. BMI is is above average; BMI management plan is completed. We discussed low calorie, low carb based diet program, portion control, increasing exercise, joining a fitness center or start home based exercise program and Weight Watchers or other Commercial based weight reduction program.    General Appearance: alert, oriented x 3, no acute distress.  Skin: warm and dry.  Diffuse changes of advanced actinic keratosis to the brow/forehead, as well as some to the buccal/maxillary areas of the face.  HEENT: Atraumatic.  pupils round and reactive to light and accommodation, oral mucosa pink and moist.  Nares patent without epistaxis.  External auditory canals are patent tympanic membranes intact.  Neck: supple, no JVD, trachea midline.  No thyromegaly  Lungs: CTA, unlabored breathing effort.  Heart: RRR, normal S1 and S2, no S3, no rub.  Abdomen: soft, non-tender, no palpable bladder, present bowel sounds to auscultation ×4.  No guarding or rigidity.  Extremities: no clubbing, cyanosis or edema.  Impaired range of motion actively and passively to right shoulder, rotator cuff function intact; Thompson/Neer creates discomfort, " cross body abduction as well.  Mild lateral subacromial space tenderness.  Normal deltoid tone and development bilaterally.  Symmetric muscle strength and development.  Paravertebral muscular spasticity noted to the lumbar spine.  Neuro: normal speech and mental status.  Cranial nerves II through XII intact.  No anosmia. DTR 2+; proprioception intact.  No focal motor/sensory deficits.        Assessment and Plan      Assessment/Plan:   Diagnoses and all orders for this visit:    1. Impingement of right shoulder (Primary)    2. Advanced actinic keratosis    3. Left sided sciatica      Proceed with use of 5-FU in treatment of advanced AK to face; will use in stages, starting with forehead/brow.    Keep scheduled f/u appt with dermatology.    If not demonstrating improvement to right shoulder and sciatica with exercises provided today, will refer to PT for training visit.    Pleased with wt loss efforts.  Vital signs demonstrate hemodynamic stability.  Discussed need for reduction in sodium/salt/caffeine intake; improve sleep habits as able; inc formal CV exercise program with goal of vigorous activity most, if not all, days of the week; goal of 250 min of sustained HR CV exercise total per week.    Discussion Summary:    Discussed plan of care in detail with pt today; pt verb understanding and agrees.    I spent 30 minutes caring for Adolfo on this date of service. This time includes time spent by me in the following activities:preparing for the visit, performing a medically appropriate examination and/or evaluation , counseling and educating the patient/family/caregiver, ordering medications, tests, or procedures, documenting information in the medical record and care coordination    I have reviewed and updated all copied forward information, as appropriate.  I attest to the accuracy and relevance of any unchanged information.    Follow up:  Return for Next scheduled follow up.     Patient Instructions   Shoulder  Impingement Syndrome  Shoulder impingement syndrome is a condition that causes pain when connective tissues (tendons) surrounding the shoulder joint become pinched. These tendons are part of the group including muscles and tissues that help to stabilize the shoulder (rotator cuff). Beneath the rotator cuff is a fluid-filled sac (bursa) that allows the muscles and tendons to glide smoothly.  The bursa may become swollen or irritated (bursitis). Bursitis, swelling in the rotator cuff tendons, or both conditions can decrease how much space is under a bone in the shoulder joint (acromion), resulting in impingement.  What are the causes?  Shoulder impingement syndrome may be caused by bursitis or swelling of the rotator cuff tendons, which may result from:  • Repetitive overhead arm movements.  • Falling onto the shoulder.  • Weakness in the shoulder muscles.  What increases the risk?  You may be more likely to develop this condition if you:  • Play sports that involve throwing, such as baseball.  • Participate in sports such as tennis, volleyball, and swimming.  • Work as a , hyatt, or .  Some people are also more likely to develop impingement syndrome because of the shape of their acromion bone.  What are the signs or symptoms?  The main symptom of this condition is pain on the front or side of the shoulder. The pain may:  • Get worse when lifting or raising the arm.  • Get worse at night.  • Wake you up from sleeping.  • Feel sharp when the shoulder is moved and then fade to an ache.  Other symptoms may include:  • Tenderness.  • Stiffness.  • Inability to raise the arm above shoulder level or behind the body.  • Weakness.  How is this diagnosed?  This condition may be diagnosed based on:  • Your symptoms and medical history.  • A physical exam.  • Imaging tests, such as:  ? X-rays.  ? MRI.  ? Ultrasound.  How is this treated?  This condition may be treated by:  • Resting your shoulder and  avoiding all activities that cause pain or put stress on the shoulder.  • Icing your shoulder.  • NSAIDs to help reduce pain and swelling.  • One or more injections of medicines to numb the area and reduce inflammation.  • Physical therapy.  • Surgery. This may be needed if nonsurgical treatments have not helped. Surgery may involve repairing the rotator cuff, reshaping the acromion, or removing the bursa.  Follow these instructions at home:  Managing pain, stiffness, and swelling    • If directed, put ice on the injured area.  ? Put ice in a plastic bag.  ? Place a towel between your skin and the bag.  ? Leave the ice on for 20 minutes, 2-3 times a day.  Activity  • Rest and return to your normal activities as told by your health care provider. Ask your health care provider what activities are safe for you.  • Do exercises as told by your health care provider.  General instructions  • Do not use any products that contain nicotine or tobacco, such as cigarettes, e-cigarettes, and chewing tobacco. These can delay healing. If you need help quitting, ask your health care provider.  • Ask your health care provider when it is safe for you to drive.  • Take over-the-counter and prescription medicines only as told by your health care provider.  • Keep all follow-up visits as told by your health care provider. This is important.  How is this prevented?  • Give your body time to rest between periods of activity.  • Be safe and responsible while being active. This will help you avoid falls.  • Maintain physical fitness, including strength and flexibility.  Contact a health care provider if:  • Your symptoms have not improved after 1-2 months of treatment and rest.  • You cannot lift your arm away from your body.  Summary  • Shoulder impingement syndrome is a condition that causes pain when connective tissues (tendons) surrounding the shoulder joint become pinched.  • The main symptom of this condition is pain on the front or  side of the shoulder.  • This condition is usually treated with rest, ice, and pain medicines as needed.  This information is not intended to replace advice given to you by your health care provider. Make sure you discuss any questions you have with your health care provider.        Shoulder Impingement Syndrome Rehab  Ask your health care provider which exercises are safe for you. Do exercises exactly as told by your health care provider and adjust them as directed. It is normal to feel mild stretching, pulling, tightness, or discomfort as you do these exercises. Stop right away if you feel sudden pain or your pain gets worse. Do not begin these exercises until told by your health care provider.  Stretching and range-of-motion exercise  This exercise warms up your muscles and joints and improves the movement and flexibility of your shoulder. This exercise also helps to relieve pain and stiffness.  Passive horizontal adduction  In passive adduction, you use your other hand to move the injured arm toward your body. The injured arm does not move on its own. In this movement, your arm is moved across your body in the horizontal plane (horizontal adduction).  14. Sit or stand and pull your left / right elbow across your chest, toward your other shoulder. Stop when you feel a gentle stretch in the back of your shoulder and upper arm.  ? Keep your arm at shoulder height.  ? Keep your arm as close to your body as you comfortably can.  15. Hold for __________ seconds.  16. Slowly return to the starting position.  Repeat __________ times. Complete this exercise __________ times a day.  Strengthening exercises  These exercises build strength and endurance in your shoulder. Endurance is the ability to use your muscles for a long time, even after they get tired.  External rotation, isometric  This is an exercise in which you press the back of your wrist against a door frame without moving your shoulder joint  (isometric).  1. Stand or sit in a doorway, facing the door frame.  2. Bend your left / right elbow and place the back of your wrist against the door frame. Only the back of your wrist should be touching the frame. Keep your upper arm at your side.  3. Gently press your wrist against the door frame, as if you are trying to push your arm away from your abdomen (external rotation). Press as hard as you are able without pain.  ? Avoid shrugging your shoulder while you press your wrist against the door frame. Keep your shoulder blade tucked down toward the middle of your back.  4. Hold for __________ seconds.  5. Slowly release the tension, and relax your muscles completely before you repeat the exercise.  Repeat __________ times. Complete this exercise __________ times a day.  Internal rotation, isometric  This is an exercise in which you press your palm against a door frame without moving your shoulder joint (isometric).  1. Stand or sit in a doorway, facing the door frame.  2. Bend your left / right elbow and place the palm of your hand against the door frame. Only your palm should be touching the frame. Keep your upper arm at your side.  3. Gently press your hand against the door frame, as if you are trying to push your arm toward your abdomen (internal rotation). Press as hard as you are able without pain.  ? Avoid shrugging your shoulder while you press your hand against the door frame. Keep your shoulder blade tucked down toward the middle of your back.  4. Hold for __________ seconds.  5. Slowly release the tension, and relax your muscles completely before you repeat the exercise.  Repeat __________ times. Complete this exercise __________ times a day.  Scapular protraction, supine    1. Lie on your back on a firm surface (supine position). Hold a __________ weight in your left / right hand.  2. Raise your left / right arm straight into the air so your hand is directly above your shoulder joint.  3. Push the  weight into the air so your shoulder (scapula) lifts off the surface that you are lying on. The scapula will push up or forward (protraction). Do not move your head, neck, or back.  4. Hold for __________ seconds.  5. Slowly return to the starting position. Let your muscles relax completely before you repeat this exercise.  Repeat __________ times. Complete this exercise __________ times a day.  Scapular retraction    1. Sit in a stable chair without armrests, or stand up.  2. Secure an exercise band to a stable object in front of you so the band is at shoulder height.  3. Hold one end of the exercise band in each hand. Your palms should face down.  4. Squeeze your shoulder blades together (retraction) and move your elbows slightly behind you. Do not shrug your shoulders upward while you do this.  5. Hold for __________ seconds.  6. Slowly return to the starting position.  Repeat __________ times. Complete this exercise __________ times a day.  Shoulder extension    1. Sit in a stable chair without armrests, or stand up.  2. Secure an exercise band to a stable object in front of you so the band is above shoulder height.  3. Hold one end of the exercise band in each hand.  4. Straighten your elbows and lift your hands up to shoulder height.  5. Squeeze your shoulder blades together and pull your hands down to the sides of your thighs (extension). Stop when your hands are straight down by your sides. Do not let your hands go behind your body.  6. Hold for __________ seconds.  7. Slowly return to the starting position.  Repeat __________ times. Complete this exercise __________ times a day.  This information is not intended to replace advice given to you by your health care provider. Make sure you discuss any questions you have with your health care provider.      Sciatica Rehab  Ask your health care provider which exercises are safe for you. Do exercises exactly as told by your health care provider and adjust them as  directed. It is normal to feel mild stretching, pulling, tightness, or discomfort as you do these exercises. Stop right away if you feel sudden pain or your pain gets worse. Do not begin these exercises until told by your health care provider.  Stretching and range-of-motion exercises  These exercises warm up your muscles and joints and improve the movement and flexibility of your hips and back. These exercises also help to relieve pain, numbness, and tingling.  Sciatic nerve glide  1. Sit in a chair with your head facing down toward your chest. Place your hands behind your back. Let your shoulders slump forward.  2. Slowly straighten one of your legs while you tilt your head back as if you are looking toward the ceiling. Only straighten your leg as far as you can without making your symptoms worse.  3. Hold this position for __________ seconds.  4. Slowly return to the starting position.  5. Repeat with your other leg.  Repeat __________ times. Complete this exercise __________ times a day.  Knee to chest with hip adduction and internal rotation    1. Lie on your back on a firm surface with both legs straight.  2. Bend one of your knees and move it up toward your chest until you feel a gentle stretch in your lower back and buttock. Then, move your knee toward the shoulder that is on the opposite side from your leg. This is hip adduction and internal rotation.  ? Hold your leg in this position by holding on to the front of your knee.  3. Hold this position for __________ seconds.  4. Slowly return to the starting position.  5. Repeat with your other leg.  Repeat __________ times. Complete this exercise __________ times a day.  Prone extension on elbows    1. Lie on your abdomen on a firm surface. A bed may be too soft for this exercise.  2. Prop yourself up on your elbows.  3. Use your arms to help lift your chest up until you feel a gentle stretch in your abdomen and your lower back.  ? This will place some of your  body weight on your elbows. If this is uncomfortable, try stacking pillows under your chest.  ? Your hips should stay down, against the surface that you are lying on. Keep your hip and back muscles relaxed.  4. Hold this position for __________ seconds.  5. Slowly relax your upper body and return to the starting position.  Repeat __________ times. Complete this exercise __________ times a day.  Strengthening exercises  These exercises build strength and endurance in your back. Endurance is the ability to use your muscles for a long time, even after they get tired.  Pelvic tilt  This exercise strengthens the muscles that lie deep in the abdomen.  1. Lie on your back on a firm surface. Bend your knees and keep your feet flat on the floor.  2. Tense your abdominal muscles. Tip your pelvis up toward the ceiling and flatten your lower back into the floor.  ? To help with this exercise, you may place a small towel under your lower back and try to push your back into the towel.  3. Hold this position for __________ seconds.  4. Let your muscles relax completely before you repeat this exercise.  Repeat __________ times. Complete this exercise __________ times a day.  Alternating arm and leg raises    1. Get on your hands and knees on a firm surface. If you are on a hard floor, you may want to use padding, such as an exercise mat, to cushion your knees.  2. Line up your arms and legs. Your hands should be directly below your shoulders, and your knees should be directly below your hips.  3. Lift your left leg behind you. At the same time, raise your right arm and straighten it in front of you.  ? Do not lift your leg higher than your hip.  ? Do not lift your arm higher than your shoulder.  ? Keep your abdominal and back muscles tight.  ? Keep your hips facing the ground.  ? Do not arch your back.  ? Keep your balance carefully, and do not hold your breath.  4. Hold this position for __________ seconds.  5. Slowly return to  the starting position.  6. Repeat with your right leg and your left arm.  Repeat __________ times. Complete this exercise __________ times a day.  Posture and body mechanics  Good posture and healthy body mechanics can help to relieve stress in your body's tissues and joints. Body mechanics refers to the movements and positions of your body while you do your daily activities. Posture is part of body mechanics. Good posture means:  • Your spine is in its natural S-curve position (neutral).  • Your shoulders are pulled back slightly.  • Your head is not tipped forward.  Follow these guidelines to improve your posture and body mechanics in your everyday activities.  Standing    • When standing, keep your spine neutral and your feet about hip width apart. Keep a slight bend in your knees. Your ears, shoulders, and hips should line up.  • When you do a task in which you  one place for a long time, place one foot up on a stable object that is 2-4 inches (5-10 cm) high, such as a footstool. This helps keep your spine neutral.  Sitting    • When sitting, keep your spine neutral and keep your feet flat on the floor. Use a footrest, if necessary, and keep your thighs parallel to the floor. Avoid rounding your shoulders, and avoid tilting your head forward.  • When working at a desk or a computer, keep your desk at a height where your hands are slightly lower than your elbows. Slide your chair under your desk so you are close enough to maintain good posture.  • When working at a computer, place your monitor at a height where you are looking straight ahead and you do not have to tilt your head forward or downward to look at the screen.  Resting  • When lying down and resting, avoid positions that are most painful for you.  • If you have pain with activities such as sitting, bending, stooping, or squatting, lie in a position in which your body does not bend very much. For example, avoid curling up on your side with your  arms and knees near your chest (fetal position).  • If you have pain with activities such as standing for a long time or reaching with your arms, lie with your spine in a neutral position and bend your knees slightly. Try the following positions:  ? Lying on your side with a pillow between your knees.  ? Lying on your back with a pillow under your knees.  Lifting    • When lifting objects, keep your feet at least shoulder width apart and tighten your abdominal muscles.  • Bend your knees and hips and keep your spine neutral. It is important to lift using the strength of your legs, not your back. Do not lock your knees straight out.  • Always ask for help to lift heavy or awkward objects.  This information is not intended to replace advice given to you by your health care provider. Make sure you discuss any questions you have with your health care provider.      Sachin Edwards DO  04/25/23  18:45 EDT

## 2023-05-16 ENCOUNTER — OFFICE VISIT (OUTPATIENT)
Dept: FAMILY MEDICINE CLINIC | Facility: CLINIC | Age: 73
End: 2023-05-16
Payer: MEDICARE

## 2023-05-16 VITALS
TEMPERATURE: 97.9 F | WEIGHT: 262.2 LBS | RESPIRATION RATE: 16 BRPM | HEIGHT: 70 IN | DIASTOLIC BLOOD PRESSURE: 98 MMHG | OXYGEN SATURATION: 94 % | HEART RATE: 64 BPM | BODY MASS INDEX: 37.54 KG/M2 | SYSTOLIC BLOOD PRESSURE: 156 MMHG

## 2023-05-16 DIAGNOSIS — R22.0 SWELLING OF FACE: ICD-10-CM

## 2023-05-16 DIAGNOSIS — L53.9 ERYTHEMA OF FACE: Primary | ICD-10-CM

## 2023-05-16 RX ORDER — PREDNISONE 10 MG/1
TABLET ORAL
Qty: 15 TABLET | Refills: 0 | Status: SHIPPED | OUTPATIENT
Start: 2023-05-16

## 2023-05-16 NOTE — PROGRESS NOTES
"                      Established Patient        Chief Complaint:   Chief Complaint   Patient presents with   • Eye Burn     Bi-lat eye pain  Rash on left side of neck            History of Present Illness:    Adolfo Suazo Jr. is a 72 y.o. male who presents today for complaints of bilateral eye swelling and facial swelling and pain. Patient has recently completed a course of fluorouracil Calcipotriene 5% 0.005% cream x 2 weeks prescribed by dermatology for sun damaged skin. Reports that he has spoken with dermatologist about alternative therapies to correct sun damaged skin and was told that this was the best course of therapy at this time. Patient states that his face is painful, inflamed, eyes are swelling and watering x 2-3 days.     Subjective     The following portions of the patient's history were reviewed and updated as appropriate: allergies, current medications, past family history, past medical history, past social history, past surgical history and problem list.    ALLERGIES  No Known Allergies    ROS  Review of Systems   HENT:        Facial swelling   Eyes: Positive for discharge.   Skin: Positive for color change and rash.       Objective     Vital Signs:   /98   Pulse 64   Temp 97.9 °F (36.6 °C)   Resp 16   Ht 177.8 cm (70\")   Wt 119 kg (262 lb 3.2 oz)   SpO2 94%   BMI 37.62 kg/m²     Class 2 Severe Obesity (BMI >=35 and <=39.9). Obesity-related health conditions include the following: hypertension, dyslipidemias and GERD. Obesity is unchanged. BMI is is above average; BMI management plan is completed. We discussed follow up with PCP.       Physical Exam   Physical Exam  Vitals and nursing note reviewed.   Constitutional:       Appearance: He is obese.   Eyes:      Extraocular Movements: Extraocular movements intact.      Conjunctiva/sclera: Conjunctivae normal.      Pupils: Pupils are equal, round, and reactive to light.   Cardiovascular:      Rate and Rhythm: Normal rate and regular " rhythm.      Heart sounds: Normal heart sounds.   Pulmonary:      Effort: Pulmonary effort is normal.      Breath sounds: Normal breath sounds.   Skin:     Findings: Erythema present.          Neurological:      Mental Status: He is alert and oriented to person, place, and time.   Psychiatric:         Mood and Affect: Mood normal.         Behavior: Behavior normal.         Assessment and Plan      Assessment/Plan:   Diagnoses and all orders for this visit:    1. Erythema of face (Primary)  -     predniSONE (DELTASONE) 10 MG tablet; Take 5 tablets today, decrease dose by 1 tablet each day until gone. 5,4,3,2,1  Dispense: 15 tablet; Refill: 0    2. Swelling of face  -     predniSONE (DELTASONE) 10 MG tablet; Take 5 tablets today, decrease dose by 1 tablet each day until gone. 5,4,3,2,1  Dispense: 15 tablet; Refill: 0      --patient has prescheduled follow up appointment with dermatologist    Discussion Summary:  Discussed plan of care in detail with pt today; pt verb understanding and agrees.      I spent 25 minutes caring for Adolfo on this date of service. This time includes time spent by me in the following activities:preparing for the visit, obtaining and/or reviewing a separately obtained history, performing a medically appropriate examination and/or evaluation , counseling and educating the patient/family/caregiver, ordering medications, tests, or procedures and documenting information in the medical record      I have reviewed and updated all copied forward information, as appropriate.  I attest to the accuracy and relevance of any unchanged information.      Follow up:  Return if symptoms worsen or fail to improve.     Patient Education:  There are no Patient Instructions on file for this visit.    LYNSEY Ramirez  05/16/23  14:03 EDT          Please note that portions of this note may have been completed with a voice recognition program.

## 2023-06-06 PROBLEM — M54.16 CHRONIC LUMBAR RADICULOPATHY: Status: ACTIVE | Noted: 2023-06-06

## 2023-06-12 DIAGNOSIS — E78.5 DYSLIPIDEMIA: Chronic | ICD-10-CM

## 2023-06-12 DIAGNOSIS — I10 ESSENTIAL HYPERTENSION: Chronic | ICD-10-CM

## 2023-06-12 RX ORDER — SIMVASTATIN 10 MG
TABLET ORAL
Qty: 90 TABLET | Refills: 0 | Status: SHIPPED | OUTPATIENT
Start: 2023-06-12

## 2023-06-12 RX ORDER — METOPROLOL SUCCINATE 25 MG/1
TABLET, EXTENDED RELEASE ORAL
Qty: 90 TABLET | Refills: 0 | Status: SHIPPED | OUTPATIENT
Start: 2023-06-12

## 2023-06-12 RX ORDER — LOSARTAN POTASSIUM 25 MG/1
TABLET ORAL
Qty: 90 TABLET | Refills: 0 | Status: SHIPPED | OUTPATIENT
Start: 2023-06-12

## 2023-10-18 DIAGNOSIS — I10 ESSENTIAL HYPERTENSION: Chronic | ICD-10-CM

## 2023-10-18 DIAGNOSIS — E78.5 DYSLIPIDEMIA: Chronic | ICD-10-CM

## 2023-10-18 RX ORDER — LOSARTAN POTASSIUM 25 MG/1
25 TABLET ORAL DAILY
Qty: 90 TABLET | Refills: 0 | Status: SHIPPED | OUTPATIENT
Start: 2023-10-18

## 2023-10-18 RX ORDER — METOPROLOL SUCCINATE 25 MG/1
25 TABLET, EXTENDED RELEASE ORAL DAILY
Qty: 90 TABLET | Refills: 0 | Status: SHIPPED | OUTPATIENT
Start: 2023-10-18

## 2023-10-18 RX ORDER — SIMVASTATIN 10 MG
10 TABLET ORAL NIGHTLY
Qty: 90 TABLET | Refills: 0 | Status: SHIPPED | OUTPATIENT
Start: 2023-10-18

## 2024-01-08 ENCOUNTER — PROCEDURE VISIT (OUTPATIENT)
Dept: UROLOGY | Facility: CLINIC | Age: 74
End: 2024-01-08
Payer: MEDICARE

## 2024-01-08 DIAGNOSIS — Z85.51 HISTORY OF BLADDER CANCER: Primary | ICD-10-CM

## 2024-01-08 PROCEDURE — 52000 CYSTOURETHROSCOPY: CPT | Performed by: UROLOGY

## 2024-01-08 NOTE — PROGRESS NOTES
Preprocedure diagnosis  history of low-grade noninvasive urothelial carcinoma, last TURBT 2/25/2021.  No current bothersome lower urinary tract symptoms and no recent gross hematuria.    Postprocedure diagnosis  No tumors identified    Procedure  Flexible Cystourethroscopy    Attending surgeon  Trey Lam MD    Anesthesia  2% lidocaine jelly intraurethrally    Complications  None    Indications  73 y.o. male undergoing a flexible cystoscopy for the above mentioned indications.  Informed consent was obtained.      Findings  Cystoscopic findings included one right and left ureteral orifice in the normal anatomic position with normal bladder mucosa and no tumors, masses or stones. The urethral urothelium was within normal limits with no strictures.  There was not a prominent median lobe.  The lateral lobes were obstructive in appearance.  There was evidence of prostatic intravesical regrowth.    Procedure  The patient was placed in supine position and prepped and draped in sterile fashion with lidocaine jelly per urethra for anesthesia.  A timeout was performed.  The 14F flexible cystoscope was lubricated and gently placed through the penile urethra and into the bladder.  The bladder was completely visualized.  The cystoscope was retroflexed and the bladder neck and prostate visualized.  The cystoscope was slowly withdrawn while visualizing the urethra and the procedure terminated.  The patient tolerated the procedure well.

## 2024-03-28 ENCOUNTER — TELEPHONE (OUTPATIENT)
Dept: FAMILY MEDICINE CLINIC | Facility: CLINIC | Age: 74
End: 2024-03-28
Payer: MEDICARE

## 2024-03-28 NOTE — TELEPHONE ENCOUNTER
Caller: Adolfo Suazo Jr.    Relationship: Self    Best call back number: [phone]     Requested Prescriptions: PAXLOVIDA  Requested Prescriptions      No prescriptions requested or ordered in this encounter    [facility]    Pharmacy where request should be sent: [facility] [address] [place] [address] - [phone] [facility] [phone]  [phone]      Last office visit with prescribing clinician: 6/6/2023   Last telemedicine visit with prescribing clinician: Visit date not found   Next office visit with prescribing clinician: Visit date not found     Additional details provided by patient: PT TESTED POSITIVE FOR COVID AND ASKED IF YOU CAN CALL THIS IN FOR HIM.    Does the patient have less than a 3 day supply:  [x] Yes  [] No    Would you like a call back once the refill request has been completed: [] Yes [x] No    If the office needs to give you a call back, can they leave a voicemail: [] Yes [x] No    Paxton Mcfadden Rep   03/28/24 12:19 EDT

## 2024-04-04 NOTE — TELEPHONE ENCOUNTER
I do not prescribe this medication without evaluation of a patient due to potential for rebound Covid.    He can be seen by anyone available.     Please see above regarding Dr. Edwards response.

## 2024-05-10 ENCOUNTER — OFFICE VISIT (OUTPATIENT)
Dept: FAMILY MEDICINE CLINIC | Facility: CLINIC | Age: 74
End: 2024-05-10
Payer: MEDICARE

## 2024-05-10 VITALS
SYSTOLIC BLOOD PRESSURE: 122 MMHG | HEIGHT: 70 IN | WEIGHT: 221.5 LBS | HEART RATE: 56 BPM | BODY MASS INDEX: 31.71 KG/M2 | OXYGEN SATURATION: 97 % | DIASTOLIC BLOOD PRESSURE: 62 MMHG

## 2024-05-10 DIAGNOSIS — Z00.00 MEDICARE ANNUAL WELLNESS VISIT, SUBSEQUENT: Primary | ICD-10-CM

## 2024-05-10 DIAGNOSIS — N40.1 BPH WITH OBSTRUCTION/LOWER URINARY TRACT SYMPTOMS: Chronic | ICD-10-CM

## 2024-05-10 DIAGNOSIS — I10 ESSENTIAL HYPERTENSION: Chronic | ICD-10-CM

## 2024-05-10 DIAGNOSIS — N13.8 BPH WITH OBSTRUCTION/LOWER URINARY TRACT SYMPTOMS: Chronic | ICD-10-CM

## 2024-05-10 DIAGNOSIS — R73.01 IMPAIRED FASTING GLUCOSE: ICD-10-CM

## 2024-05-10 DIAGNOSIS — E78.5 DYSLIPIDEMIA: Chronic | ICD-10-CM

## 2024-05-10 LAB
EXPIRATION DATE: NORMAL
HBA1C MFR BLD: 5.5 % (ref 4.5–5.7)
Lab: NORMAL

## 2024-05-10 RX ORDER — KETOCONAZOLE 20 MG/G
CREAM TOPICAL
COMMUNITY
Start: 2024-04-29

## 2024-05-10 RX ORDER — DOXYCYCLINE 100 MG/1
1 CAPSULE ORAL EVERY 12 HOURS SCHEDULED
COMMUNITY
Start: 2024-04-09

## 2024-05-10 RX ORDER — SIMVASTATIN 40 MG
40 TABLET ORAL
COMMUNITY
Start: 2024-04-25

## 2024-05-22 ENCOUNTER — OFFICE VISIT (OUTPATIENT)
Dept: FAMILY MEDICINE CLINIC | Facility: CLINIC | Age: 74
End: 2024-05-22
Payer: MEDICARE

## 2024-05-22 VITALS
HEART RATE: 67 BPM | OXYGEN SATURATION: 97 % | SYSTOLIC BLOOD PRESSURE: 142 MMHG | DIASTOLIC BLOOD PRESSURE: 86 MMHG | TEMPERATURE: 97.6 F | HEIGHT: 70 IN | BODY MASS INDEX: 32.35 KG/M2 | WEIGHT: 226 LBS | RESPIRATION RATE: 16 BRPM

## 2024-05-22 DIAGNOSIS — S39.012A LOW BACK STRAIN, INITIAL ENCOUNTER: ICD-10-CM

## 2024-05-22 DIAGNOSIS — I10 ESSENTIAL HYPERTENSION: Chronic | ICD-10-CM

## 2024-05-22 DIAGNOSIS — M54.32 LEFT SIDED SCIATICA: Primary | ICD-10-CM

## 2024-05-22 PROCEDURE — 1126F AMNT PAIN NOTED NONE PRSNT: CPT | Performed by: NURSE PRACTITIONER

## 2024-05-22 PROCEDURE — 3079F DIAST BP 80-89 MM HG: CPT | Performed by: NURSE PRACTITIONER

## 2024-05-22 PROCEDURE — 1159F MED LIST DOCD IN RCRD: CPT | Performed by: NURSE PRACTITIONER

## 2024-05-22 PROCEDURE — 99213 OFFICE O/P EST LOW 20 MIN: CPT | Performed by: NURSE PRACTITIONER

## 2024-05-22 PROCEDURE — 3077F SYST BP >= 140 MM HG: CPT | Performed by: NURSE PRACTITIONER

## 2024-05-22 PROCEDURE — 1160F RVW MEDS BY RX/DR IN RCRD: CPT | Performed by: NURSE PRACTITIONER

## 2024-05-22 RX ORDER — METHOCARBAMOL 500 MG/1
500 TABLET, FILM COATED ORAL 3 TIMES DAILY
Qty: 30 TABLET | Refills: 0 | Status: SHIPPED | OUTPATIENT
Start: 2024-05-22

## 2024-05-22 NOTE — PROGRESS NOTES
"                      Established Patient        Chief Complaint:   Chief Complaint   Patient presents with    Hip Pain     Pt is here for hip pain on left side and is going down the leg.          History of Present Illness:    Adolfo Suazo Jr. is a 73 y.o. male who presents today for complaints of left sciatic pain.     Patient went to chiropractor one week ago for c/o vertigo. Reports that his back was adjusted at that time and since then has had nearly constant sciatic pain on left side.     Symptoms immediately after adjustment.     Reports pain mostly in middle of butt cheek. Reports that it feels warm to the touch. Denies numbness/tinging of foot. Pain radiates down back of his leg.     Improved by sitting/resting.     BP elevated at time of appointment today. Patient currently experiencing pain.     Subjective     The following portions of the patient's history were reviewed and updated as appropriate: allergies, current medications, past family history, past medical history, past social history, past surgical history and problem list.    ALLERGIES  No Known Allergies    ROS  Review of Systems   Musculoskeletal:  Positive for arthralgias and back pain. Negative for gait problem.   Neurological:  Negative for weakness and numbness.       Objective     Vital Signs:   /86   Pulse 67   Temp 97.6 °F (36.4 °C)   Resp 16   Ht 177.8 cm (70\")   Wt 103 kg (226 lb)   SpO2 97%   BMI 32.43 kg/m²     BMI is >= 30 and <35. (Class 1 Obesity). The following options were offered after discussion;: information on healthy weight added to patient's after visit summary           Physical Exam   Physical Exam  Vitals and nursing note reviewed.   Cardiovascular:      Rate and Rhythm: Normal rate.   Pulmonary:      Effort: Pulmonary effort is normal.      Breath sounds: Normal breath sounds.   Musculoskeletal:      Lumbar back: Spasms and tenderness present. No swelling or edema. Decreased range of motion. "   Neurological:      Mental Status: He is alert and oriented to person, place, and time.   Psychiatric:         Mood and Affect: Mood normal.         Behavior: Behavior normal.         Assessment and Plan      Assessment/Plan:   Diagnoses and all orders for this visit:    1. Left sided sciatica (Primary)  -     methocarbamol (ROBAXIN) 500 MG tablet; Take 1 tablet by mouth 3 (Three) Times a Day.  Dispense: 30 tablet; Refill: 0    2. Low back strain, initial encounter  -     methocarbamol (ROBAXIN) 500 MG tablet; Take 1 tablet by mouth 3 (Three) Times a Day.  Dispense: 30 tablet; Refill: 0    3. Essential hypertension    Risks, benefits, and potential side effects of current/new medications reviewed with patient.  Patient voiced understanding and wished to proceed with treatment.    May alternate Acetaminophen and Ibuprofen every 4-6 hours as needed for pain.    Moist heat to area multiple times daily x 20-30 minutes each time.     Encourage massage and gentle stretching.    Discussed need for reduction in sodium/salt/caffeine intake; improve sleep habits as able; inc formal CV exercise program with goal of vigorous activity most, if not all, days of the week.     Ambulatory blood pressure monitoring encouraged prior to taking medication daily and as needed.    Contact office for symptomatic HTN or consistently uncontrolled HTN.     Patient to increase dietary intake of vegetables, fruits, nuts, whole grains and fish. Decrease dietary intake of carbs, processed foods such as lunch meats, saturated fats, sugary beverages.     Increase exercise regimen as tolerated toward a goal of 120-150 minutes/week.     Patient to present to ED for chest pain, confusion, vision disturbance.     Patient was encouraged to keep me informed of any acute changes, lack of improvement, or any new concerning symptoms.      Discussion Summary:  Discussed plan of care in detail with pt today; pt verb understanding and agrees.      I have  reviewed and updated all copied forward information, as appropriate.  I attest to the accuracy and relevance of any unchanged information.      Follow up:  Return for Follow up with PCP if symptoms worsen or persist..     Patient Education:  There are no Patient Instructions on file for this visit.    LYNSEY Ramirez  06/05/24  18:26 EDT          Please note that portions of this note may have been completed with a voice recognition program.

## 2024-05-23 ENCOUNTER — APPOINTMENT (OUTPATIENT)
Dept: CT IMAGING | Facility: HOSPITAL | Age: 74
End: 2024-05-23
Payer: MEDICARE

## 2024-05-23 ENCOUNTER — HOSPITAL ENCOUNTER (EMERGENCY)
Facility: HOSPITAL | Age: 74
Discharge: HOME | End: 2024-05-23
Attending: EMERGENCY MEDICINE
Payer: MEDICARE

## 2024-05-23 VITALS
SYSTOLIC BLOOD PRESSURE: 179 MMHG | RESPIRATION RATE: 18 BRPM | OXYGEN SATURATION: 98 % | BODY MASS INDEX: 32.35 KG/M2 | DIASTOLIC BLOOD PRESSURE: 91 MMHG | WEIGHT: 226 LBS | HEART RATE: 69 BPM | HEIGHT: 70 IN | TEMPERATURE: 98.4 F

## 2024-05-23 DIAGNOSIS — K59.00 CONSTIPATION, UNSPECIFIED CONSTIPATION TYPE: Primary | ICD-10-CM

## 2024-05-23 DIAGNOSIS — K62.5 RECTAL BLEEDING: ICD-10-CM

## 2024-05-23 LAB
ALBUMIN SERPL-MCNC: 4 G/DL (ref 3.5–5.2)
ALBUMIN/GLOB SERPL: 2.1 G/DL
ALP SERPL-CCNC: 60 U/L (ref 39–117)
ALT SERPL W P-5'-P-CCNC: 24 U/L (ref 1–41)
ANION GAP SERPL CALCULATED.3IONS-SCNC: 7.1 MMOL/L (ref 5–15)
AST SERPL-CCNC: 30 U/L (ref 1–40)
BASOPHILS # BLD AUTO: 0.05 10*3/MM3 (ref 0–0.2)
BASOPHILS NFR BLD AUTO: 0.6 % (ref 0–1.5)
BILIRUB SERPL-MCNC: 1 MG/DL (ref 0–1.2)
BUN SERPL-MCNC: 15 MG/DL (ref 8–23)
BUN/CREAT SERPL: 15 (ref 7–25)
CALCIUM SPEC-SCNC: 9 MG/DL (ref 8.6–10.5)
CHLORIDE SERPL-SCNC: 109 MMOL/L (ref 98–107)
CO2 SERPL-SCNC: 22.9 MMOL/L (ref 22–29)
CREAT SERPL-MCNC: 1 MG/DL (ref 0.76–1.27)
D-LACTATE SERPL-SCNC: 1.2 MMOL/L (ref 0.5–2)
DEPRECATED RDW RBC AUTO: 45.3 FL (ref 37–54)
EGFRCR SERPLBLD CKD-EPI 2021: 79 ML/MIN/1.73
EOSINOPHIL # BLD AUTO: 0.06 10*3/MM3 (ref 0–0.4)
EOSINOPHIL NFR BLD AUTO: 0.8 % (ref 0.3–6.2)
ERYTHROCYTE [DISTWIDTH] IN BLOOD BY AUTOMATED COUNT: 13.4 % (ref 12.3–15.4)
GLOBULIN UR ELPH-MCNC: 1.9 GM/DL
GLUCOSE SERPL-MCNC: 119 MG/DL (ref 65–99)
HCT VFR BLD AUTO: 42.1 % (ref 37.5–51)
HGB BLD-MCNC: 14.3 G/DL (ref 13–17.7)
IMM GRANULOCYTES # BLD AUTO: 0.04 10*3/MM3 (ref 0–0.05)
IMM GRANULOCYTES NFR BLD AUTO: 0.5 % (ref 0–0.5)
INR PPP: 1.04 (ref 0.9–1.1)
LYMPHOCYTES # BLD AUTO: 1.94 10*3/MM3 (ref 0.7–3.1)
LYMPHOCYTES NFR BLD AUTO: 24.9 % (ref 19.6–45.3)
MCH RBC QN AUTO: 31 PG (ref 26.6–33)
MCHC RBC AUTO-ENTMCNC: 34 G/DL (ref 31.5–35.7)
MCV RBC AUTO: 91.3 FL (ref 79–97)
MONOCYTES # BLD AUTO: 0.39 10*3/MM3 (ref 0.1–0.9)
MONOCYTES NFR BLD AUTO: 5 % (ref 5–12)
NEUTROPHILS NFR BLD AUTO: 5.32 10*3/MM3 (ref 1.7–7)
NEUTROPHILS NFR BLD AUTO: 68.2 % (ref 42.7–76)
NRBC BLD AUTO-RTO: 0 /100 WBC (ref 0–0.2)
PLATELET # BLD AUTO: 178 10*3/MM3 (ref 140–450)
PMV BLD AUTO: 11.4 FL (ref 6–12)
POTASSIUM SERPL-SCNC: 3.6 MMOL/L (ref 3.5–5.2)
PROT SERPL-MCNC: 5.9 G/DL (ref 6–8.5)
PROTHROMBIN TIME: 14.1 SECONDS (ref 12.3–15.1)
RBC # BLD AUTO: 4.61 10*6/MM3 (ref 4.14–5.8)
SODIUM SERPL-SCNC: 139 MMOL/L (ref 136–145)
WBC NRBC COR # BLD AUTO: 7.8 10*3/MM3 (ref 3.4–10.8)

## 2024-05-23 PROCEDURE — 74174 CTA ABD&PLVS W/CONTRAST: CPT

## 2024-05-23 PROCEDURE — 83605 ASSAY OF LACTIC ACID: CPT | Performed by: EMERGENCY MEDICINE

## 2024-05-23 PROCEDURE — 25510000001 IOPAMIDOL 61 % SOLUTION: Performed by: EMERGENCY MEDICINE

## 2024-05-23 PROCEDURE — 85610 PROTHROMBIN TIME: CPT | Performed by: EMERGENCY MEDICINE

## 2024-05-23 PROCEDURE — 85025 COMPLETE CBC W/AUTO DIFF WBC: CPT | Performed by: EMERGENCY MEDICINE

## 2024-05-23 PROCEDURE — 80053 COMPREHEN METABOLIC PANEL: CPT | Performed by: EMERGENCY MEDICINE

## 2024-05-23 PROCEDURE — 99285 EMERGENCY DEPT VISIT HI MDM: CPT

## 2024-05-23 RX ORDER — SODIUM CHLORIDE 0.9 % (FLUSH) 0.9 %
10 SYRINGE (ML) INJECTION AS NEEDED
Status: DISCONTINUED | OUTPATIENT
Start: 2024-05-23 | End: 2024-05-23 | Stop reason: HOSPADM

## 2024-05-23 RX ADMIN — IOPAMIDOL 100 ML: 612 INJECTION, SOLUTION INTRAVENOUS at 16:08

## 2024-05-23 NOTE — ED PROVIDER NOTES
EMERGENCY DEPARTMENT ENCOUNTER    Pt Name: Adolfo Suazo Jr.  MRN: [id number]  Pt :   1950  Room Number:    Date of encounter:  2024  PCP: Sachin Edwards DO  ED Provider: Rahcid Bone MD    Historian: Patient      HPI:  Chief Complaint   Patient presents with    Rectal Bleeding          Context: Adolfo Suazo Jr. is a 73 y.o. male who presents to the ED c/o rectal bleeding, the patient reports that he has been having significant constipation, has tried multiple medications, today he tried a suppository and after placing suppository with significant bleeding coming from his rectum.  Has not had any blood in the toilet bowl, reports he was previously on a keto diet and started eating carbs in the last several days which he is concerned led to his constipation      PAST MEDICAL HISTORY  Past Medical History:   Diagnosis Date    Arthritis     Cancer 2021    Bladder, Small tumor removed 2021    Diabetes mellitus     History of medical problems Skin cancer shoulder    small basal cell removed. I  moved before follow up.    Hypertension 2010    Losartan, Metoprolol Under control         PAST SURGICAL HISTORY  Past Surgical History:   Procedure Laterality Date    BLADDER SURGERY      COLONOSCOPY  2018    Told to come back in 10 years, previous colonoscopies had several non cancerous small polyps removed    HERNIA REPAIR      INGUINAL HERNIA REPAIR      Double Hernia, no subsequent issues         FAMILY HISTORY  Family History   Problem Relation Age of Onset    Hypertension Mother     Heart disease Mother         Heart attacks, no surgery    Arthritis Mother         Knee replacements when she was 80, lived to 96    Hearing loss Mother         Hearing Aides    Hyperlipidemia Mother     Kidney disease Mother         Twisted plumbing on both kidneys.    Stroke Mother         several TIA's in her 80's    Diabetes Father         Type 2    Hypertension Father     Heart  disease Father         Heart attacks and  of congestive heart failure    Arthritis Father         General arthritis knees    Hearing loss Father         Hearing Aides    Hyperlipidemia Father     Arthritis Brother         Neck and back issues    Hearing loss Brother         Hearing Aides    Hyperlipidemia Brother          SOCIAL HISTORY  Social History     Socioeconomic History    Marital status: Single   Tobacco Use    Smoking status: Former     Types: Cigars     Quit date:      Years since quittin.3     Passive exposure: Never    Smokeless tobacco: Never   Vaping Use    Vaping status: Never Used   Substance and Sexual Activity    Alcohol use: Not Currently     Comment: Quit drinking alcohol in     Drug use: Never    Sexual activity: Not Currently     Partners: Female     Comment: n/a         ALLERGIES  Patient has no known allergies.        REVIEW OF SYSTEMS  Review of Systems   Constitutional:  Negative for chills and fever.   HENT:  Negative for sore throat and trouble swallowing.    Eyes:  Negative for pain and redness.   Respiratory:  Negative for cough and shortness of breath.    Cardiovascular:  Negative for chest pain and leg swelling.   Gastrointestinal:  Positive for blood in stool and constipation. Negative for abdominal pain, nausea and vomiting.   Genitourinary:  Negative for dysuria and urgency.   Musculoskeletal:  Negative for back pain and neck pain.   Skin:  Negative for rash and wound.   Neurological:  Negative for dizziness and weakness.        All systems reviewed and negative except for those discussed in HPI.       PHYSICAL EXAM    I have reviewed the triage vital signs and nursing notes.    ED Triage Vitals [24 1412]   Temp Heart Rate Resp BP SpO2   98.5 °F (36.9 °C) 69 18 (!) 184/88 98 %      Temp src Heart Rate Source Patient Position BP Location FiO2 (%)   Oral Monitor Sitting Left arm --       Physical Exam  Exam conducted with a chaperone present.   Constitutional:        Appearance: Normal appearance. He is not ill-appearing.   HENT:      Head: Normocephalic and atraumatic.      Right Ear: External ear normal.      Left Ear: External ear normal.      Nose: Nose normal.      Mouth/Throat:      Mouth: Mucous membranes are moist.      Pharynx: Oropharynx is clear.   Eyes:      Extraocular Movements: Extraocular movements intact.      Conjunctiva/sclera: Conjunctivae normal.      Pupils: Pupils are equal, round, and reactive to light.   Cardiovascular:      Rate and Rhythm: Normal rate and regular rhythm.      Pulses:           Radial pulses are 2+ on the right side and 2+ on the left side.      Heart sounds: No murmur heard.  Pulmonary:      Effort: Pulmonary effort is normal.      Breath sounds: Normal breath sounds.   Abdominal:      General: There is no distension.      Tenderness: There is no abdominal tenderness. There is no guarding.   Genitourinary:     Rectum: Guaiac result positive. Tenderness present. No mass, anal fissure or external hemorrhoid. Normal anal tone.   Musculoskeletal:         General: No swelling or deformity.      Cervical back: Normal range of motion and neck supple.   Skin:     General: Skin is warm and dry.      Capillary Refill: Capillary refill takes less than 2 seconds.      Findings: No rash.   Neurological:      General: No focal deficit present.      Mental Status: He is alert and oriented to person, place, and time.            LAB RESULTS  Recent Results (from the past 24 hour(s))   Comprehensive Metabolic Panel    Collection Time: 05/23/24  2:51 PM    Specimen: Blood   Result Value Ref Range    Glucose 119 (H) 65 - 99 mg/dL    BUN 15 8 - 23 mg/dL    Creatinine 1.00 0.76 - 1.27 mg/dL    Sodium 139 136 - 145 mmol/L    Potassium 3.6 3.5 - 5.2 mmol/L    Chloride 109 (H) 98 - 107 mmol/L    CO2 22.9 22.0 - 29.0 mmol/L    Calcium 9.0 8.6 - 10.5 mg/dL    Total Protein 5.9 (L) 6.0 - 8.5 g/dL    Albumin 4.0 3.5 - 5.2 g/dL    ALT (SGPT) 24 1 - 41 U/L     AST (SGOT) 30 1 - 40 U/L    Alkaline Phosphatase 60 39 - 117 U/L    Total Bilirubin 1.0 0.0 - 1.2 mg/dL    Globulin 1.9 gm/dL    A/G Ratio 2.1 g/dL    BUN/Creatinine Ratio 15.0 7.0 - 25.0    Anion Gap 7.1 5.0 - 15.0 mmol/L    eGFR 79.0 >60.0 mL/min/1.73   Lactic Acid, Plasma    Collection Time: 05/23/24  2:51 PM    Specimen: Blood   Result Value Ref Range    Lactate 1.2 0.5 - 2.0 mmol/L   Protime-INR    Collection Time: 05/23/24  2:51 PM    Specimen: Blood   Result Value Ref Range    Protime 14.1 12.3 - 15.1 Seconds    INR 1.04 0.90 - 1.10   CBC Auto Differential    Collection Time: 05/23/24  2:51 PM    Specimen: Blood   Result Value Ref Range    WBC 7.80 3.40 - 10.80 10*3/mm3    RBC 4.61 4.14 - 5.80 10*6/mm3    Hemoglobin 14.3 13.0 - 17.7 g/dL    Hematocrit 42.1 37.5 - 51.0 %    MCV 91.3 79.0 - 97.0 fL    MCH 31.0 26.6 - 33.0 pg    MCHC 34.0 31.5 - 35.7 g/dL    RDW 13.4 12.3 - 15.4 %    RDW-SD 45.3 37.0 - 54.0 fl    MPV 11.4 6.0 - 12.0 fL    Platelets 178 140 - 450 10*3/mm3    Neutrophil % 68.2 42.7 - 76.0 %    Lymphocyte % 24.9 19.6 - 45.3 %    Monocyte % 5.0 5.0 - 12.0 %    Eosinophil % 0.8 0.3 - 6.2 %    Basophil % 0.6 0.0 - 1.5 %    Immature Grans % 0.5 0.0 - 0.5 %    Neutrophils, Absolute 5.32 1.70 - 7.00 10*3/mm3    Lymphocytes, Absolute 1.94 0.70 - 3.10 10*3/mm3    Monocytes, Absolute 0.39 0.10 - 0.90 10*3/mm3    Eosinophils, Absolute 0.06 0.00 - 0.40 10*3/mm3    Basophils, Absolute 0.05 0.00 - 0.20 10*3/mm3    Immature Grans, Absolute 0.04 0.00 - 0.05 10*3/mm3    nRBC 0.0 0.0 - 0.2 /100 WBC       If labs were ordered, I independently reviewed the results and considered them in treating the patient.        RADIOLOGY  CT Angiogram Abdomen Pelvis    Result Date: 5/23/2024  PROCEDURE: CT ANGIOGRAM ABDOMEN PELVIS-  HISTORY: Abdominal pain, lower GI bleeding  COMPARISON: None.  PROCEDURE: The patient was injected with IV contrast. Axial images were obtained from the lung bases to the pubic symphysis by computed  tomography. This study was performed with techniques to keep radiation doses as low as reasonably achievable, (ALARA). Individualized dose reduction techniques using automated exposure control or adjustment of mA and/or kV according to the patient size were employed.  FINDINGS:  ABDOMEN: The lung bases are clear. The heart is proper size. The liver is homogenous with no focal abnormality. Gallbladder present with no CT visible stones. The spleen is unremarkable. No adrenal mass is present. The pancreas is unremarkable. The kidneys are unremarkable, without evidence of mass or hydronephrosis. The aorta is proper caliber. There is no free fluid or adenopathy.  PELVIS: The GI tract demonstrates no obstruction.  The appendix is identified and appears normal. Prostate is normal in size. There is diverticulosis with no evidence of diverticulitis. Moderate stool burden noted suggesting constipation. Question rectal wall thickening. There is no infiltration of the surrounding fat. Proctitis is not excluded. The urinary bladder is unremarkable. There is no free fluid, adenopathy, or inflammatory process.      Constipation.  Diverticulosis.  Question rectal wall thickening but no infiltration of the surrounding fat, proctitis in the differential.  CTDI: DLP:380.06 mGy.cm  This report was signed and finalized on 5/23/2024 4:32 PM by Karime Chase MD.           PROCEDURES    Procedures    Interpretations    O2 Sat: The patients oxygen saturation was 98% on Room Air.  This was independently interpreted by me as Normal      Radiology: I ordered and independently reviewed the above noted radiographic studies.  I viewed images of CT Abdomen/Pelvis which showed  constipation  per my independent interpretation. See radiologist's dictation for official interpretation.         MEDICATIONS GIVEN IN ER    Medications   sodium chloride 0.9 % flush 10 mL (has no administration in time range)   iopamidol (ISOVUE-300) 61 % injection 100 mL  (100 mL Intravenous Given 5/23/24 1608)         MEDICAL DECISION MAKING, PROGRESS, and CONSULTS    All labs, if obtained, have been independently reviewed by me.  All radiology studies, if obtained, have been reviewed by me and the radiologist dictating the report.  All EKG's, if obtained, have been independently viewed and interpreted by me      Discussion below represents my analysis of pertinent findings related to patient's condition, differential diagnosis, treatment plan and final disposition.      Differential diagnosis:    73-year-old male presented to the ED with complaint of rectal bleeding, happened after he placed a suppository in his rectum for constipation.  Rectal exam reveals significant stool in the vault, as well as a little bit of blood, he has no abdominal pain or vomiting I do not think he has an acute bowel obstruction, I am concerned about possible bleeding given his age, risk factors of prior polyps.  Will obtain CTA of the abdomen pelvis, laboratory workup including coagulation studies, hemoglobin    Additional Sources:  External (non-ED) record review:  Primary care note from 5/22/2024       Orders placed during this visit:  Orders Placed This Encounter   Procedures    CT Angiogram Abdomen Pelvis    Comprehensive Metabolic Panel    Lactic Acid, Plasma    Protime-INR    CBC Auto Differential    Ambulatory Referral to Gastroenterology    Soap suds enema    Insert Peripheral IV    CBC & Differential         Additional orders considered but not ordered:  None    ED Course:    Consultants:  None    ED Course as of 05/23/24 1810   Thu May 23, 2024   1527 Hemoglobin: 14.3  Hemoglobin is stable [CS]   1528 Lactate: 1.2  Lactic negative, likely not significant GI bleed. [CS]   1644 CT scan shows constipation possible proctitis, no other findings.  Discussed with the patient treatment options, will proceed with a soapsuds enema to fully clear the patient. [CS]   1808 Patient was able to have some  stool after soapsuds enema, we discussed disimpaction but the patient would prefer to try GoLytely at home, will return to the ER for worsening symptoms, will refer him to GI. [CS]      ED Course User Index  [CS] Rachid Bone MD           After my consideration of clinical presentation and any laboratory/radiology studies obtained, I discussed the findings with the patient/patient representative who is in agreement with the treatment plan and the final disposition. Risks and benefits of discharge were discussed.     AS OF 18:10 EDT VITALS:    BP - (!) 184/88  HR - 69  TEMP - 98.5 °F (36.9 °C) (Oral)  O2 SATS - 98%    I reviewed the patients prescription monitoring report if available prior to discharge    DIAGNOSIS  Final diagnoses:   Constipation, unspecified constipation type   Rectal bleeding         DISPOSITION  ED Disposition       ED Disposition   Discharge    Condition   Stable    Comment   --                   Please note that portions of this document were completed with voice recognition software.        Rachid Bone MD  05/23/24 [id number]

## 2024-07-15 ENCOUNTER — OFFICE VISIT (OUTPATIENT)
Dept: GASTROENTEROLOGY | Facility: CLINIC | Age: 74
End: 2024-07-15
Payer: MEDICARE

## 2024-07-15 VITALS
HEART RATE: 50 BPM | OXYGEN SATURATION: 97 % | WEIGHT: 221 LBS | BODY MASS INDEX: 31.71 KG/M2 | SYSTOLIC BLOOD PRESSURE: 120 MMHG | DIASTOLIC BLOOD PRESSURE: 78 MMHG

## 2024-07-15 DIAGNOSIS — Z12.11 ENCOUNTER FOR SCREENING FOR MALIGNANT NEOPLASM OF COLON: ICD-10-CM

## 2024-07-15 DIAGNOSIS — R19.5 POSITIVE COLORECTAL CANCER SCREENING USING DNA-BASED STOOL TEST: Primary | Chronic | ICD-10-CM

## 2024-07-15 DIAGNOSIS — R93.3 ABNORMAL CT SCAN, COLON: Chronic | ICD-10-CM

## 2024-07-15 DIAGNOSIS — K59.00 CONSTIPATION, UNSPECIFIED CONSTIPATION TYPE: Chronic | ICD-10-CM

## 2024-07-15 DIAGNOSIS — R19.4 CHANGE IN BOWEL HABITS: Chronic | ICD-10-CM

## 2024-07-15 RX ORDER — BISACODYL 5 MG/1
TABLET, DELAYED RELEASE ORAL
Qty: 4 TABLET | Refills: 0 | Status: SHIPPED | OUTPATIENT
Start: 2024-07-15 | End: 2024-07-17 | Stop reason: SDUPTHER

## 2024-07-15 RX ORDER — SODIUM, POTASSIUM,MAG SULFATES 17.5-3.13G
SOLUTION, RECONSTITUTED, ORAL ORAL
Qty: 177 ML | Refills: 0 | Status: SHIPPED | OUTPATIENT
Start: 2024-07-15

## 2024-07-15 RX ORDER — SODIUM CHLORIDE 9 MG/ML
70 INJECTION, SOLUTION INTRAVENOUS CONTINUOUS PRN
OUTPATIENT
Start: 2024-07-15

## 2024-07-15 NOTE — PATIENT INSTRUCTIONS
High fiber, low fat diet with liberal water intake.   Metamucil 1 packet/scoop daily or fiber gummies/capsules 2-4 per day.   Low FODMAP diet - avoid all dairy. May use lactose free/dairy free alternatives such as almond milk, rice milk, oat milk, etc.   Miralax 17 grams daily for constipation as needed.  May add stool softeners 2 per day as needed for constipation.   Colonoscopy: The indications, technique, alternatives and potential risk and complications were discussed with the patient including but not limited to bleeding, perforations, missing lesions and anesthetic complications. The patient understands and wishes to proceed with the procedure and has given their verbal consent. Written patient education information was given to the patient.   The patient will call if they have further questions before procedure.       Low-FODMAP Eating Plan    FODMAP stands for fermentable oligosaccharides, disaccharides, monosaccharides, and polyols. These are sugars that are hard for some people to digest. A low-FODMAP eating plan may help some people who have irritable bowel syndrome (IBS) and certain other bowel (intestinal) diseases to manage their symptoms.  This meal plan can be complicated to follow. Work with a diet and nutrition specialist (dietitian) to make a low-FODMAP eating plan that is right for you. A dietitian can help make sure that you get enough nutrition from this diet.  What are tips for following this plan?  Reading food labels  Check labels for hidden FODMAPs such as:  High-fructose syrup.  Honey.  Agave.  Natural fruit flavors.  Onion or garlic powder.  Choose low-FODMAP foods that contain 3-4 grams of fiber per serving.  Check food labels for serving sizes. Eat only one serving at a time to make sure FODMAP levels stay low.  Shopping  Shop with a list of foods that are recommended on this diet and make a meal plan.  Meal planning  Follow a low-FODMAP eating plan for up to 6 weeks, or as told by your  health care provider or dietitian.  To follow the eating plan:  Eliminate high-FODMAP foods from your diet completely. Choose only low-FODMAP foods to eat. You will do this for 2-6 weeks.  Gradually reintroduce high-FODMAP foods into your diet one at a time. Most people should wait a few days before introducing the next new high-FODMAP food into their meal plan. Your dietitian can recommend how quickly you may reintroduce foods.  Keep a daily record of what and how much you eat and drink. Make note of any symptoms that you have after eating.  Review your daily record with a dietitian regularly to identify which foods you can eat and which foods you should avoid.  General tips  Drink enough fluid each day to keep your urine pale yellow.  Avoid processed foods. These often have added sugar and may be high in FODMAPs.  Avoid most dairy products, whole grains, and sweeteners.  Work with a dietitian to make sure you get enough fiber in your diet.  Avoid high FODMAP foods at meals to manage symptoms.    Recommended foods  Fruits  Bananas, oranges, tangerines, genet, limes, blueberries, raspberries, strawberries, grapes, cantaloupe, honeydew melon, kiwi, papaya, passion fruit, and pineapple. Limited amounts of dried cranberries, banana chips, and shredded coconut.  Vegetables  Eggplant, zucchini, cucumber, peppers, green beans, bean sprouts, lettuce, arugula, kale, Swiss chard, spinach, sue greens, bok haris, summer squash, potato, and tomato. Limited amounts of corn, carrot, and sweet potato. Green parts of scallions.  Grains  Gluten-free grains, such as rice, oats, buckwheat, quinoa, corn, polenta, and millet. Gluten-free pasta, bread, or cereal. Rice noodles. Corn tortillas.  Meats and other proteins  Unseasoned beef, pork, poultry, or fish. Eggs. Castañeda. Tofu (firm) and tempeh. Limited amounts of nuts and seeds, such as almonds, walnuts, brazil nuts, pecans, peanuts, nut butters, pumpkin seeds, tasha seeds, and  "sunflower seeds.  Dairy  Lactose-free milk, yogurt, and kefir. Lactose-free cottage cheese and ice cream. Non-dairy milks, such as almond, coconut, hemp, and rice milk. Non-dairy yogurt. Limited amounts of goat cheese, brie, mozzarella, parmesan, swiss, and other hard cheeses.  Fats and oils  Butter-free spreads. Vegetable oils, such as olive, canola, and sunflower oil.  Seasoning and other foods  Artificial sweeteners with names that do not end in \"ol,\" such as aspartame, saccharine, and stevia. Maple syrup, white table sugar, raw sugar, brown sugar, and molasses. Mayonnaise, soy sauce, and tamari. Fresh basil, coriander, parsley, rosemary, and thyme.  Beverages  Water and mineral water. Sugar-sweetened soft drinks. Small amounts of orange juice or cranberry juice. Black and green tea. Most dry mee. Coffee.  The items listed above may not be a complete list of foods and beverages you can eat. Contact a dietitian for more information.    Foods to avoid  Fruits  Fresh, dried, and juiced forms of apple, pear, watermelon, peach, plum, cherries, apricots, blackberries, boysenberries, figs, nectarines, and twan. Avocado.  Vegetables  Chicory root, artichoke, asparagus, cabbage, snow peas, [place] sprouts, broccoli, sugar snap peas, mushrooms, celery, and cauliflower. Onions, garlic, leeks, and the white part of scallions.  Grains  Wheat, including kamut, durum, and semolina. Barley and bulgur. Couscous. Wheat-based cereals. Wheat noodles, bread, crackers, and pastries.  Meats and other proteins  Fried or fatty meat. Sausage. Cashews and pistachios. Soybeans, baked beans, black beans, chickpeas, kidney beans, [place] beans, navy beans, lentils, black-eyed peas, and split peas.  Dairy  Milk, yogurt, ice cream, and soft cheese. Cream and sour cream. Milk-based sauces. Custard. Buttermilk. Soy milk.  Seasoning and other foods  Any sugar-free gum or candy. Foods that contain artificial sweeteners such as sorbitol, mannitol, " isomalt, or xylitol. Foods that contain honey, high-fructose corn syrup, or agave. Bouillon, vegetable stock, beef stock, and chicken stock. Garlic and onion powder. Condiments made with onion, such as hummus, chutney, pickles, relish, salad dressing, and salsa. Tomato paste.  Beverages  Chicory-based drinks. Coffee substitutes. Chamomile tea. Fennel tea. Sweet or fortified mee such as port or anil. Diet soft drinks made with isomalt, mannitol, maltitol, sorbitol, or xylitol. Apple, pear, and twan juice. Juices with high-fructose corn syrup.  The items listed above may not be a complete list of foods and beverages you should avoid. Contact a dietitian for more information.    Summary  FODMAP stands for fermentable oligosaccharides, disaccharides, monosaccharides, and polyols. These are sugars that are hard for some people to digest.  A low-FODMAP eating plan is a short-term diet that helps to ease symptoms of certain bowel diseases.  The eating plan usually lasts up to 6 weeks. After that, high-FODMAP foods are reintroduced gradually and one at a time. This can help you find out which foods may be causing symptoms.  A low-FODMAP eating plan can be complicated. It is best to work with a dietitian who has experience with this type of plan.  This information is not intended to replace advice given to you by your health care provider. Make sure you discuss any questions you have with your health care provider.  Document Revised: 05/06/2021 Document Reviewed: 05/06/2021  [employer] Patient Education © 2023 [employer]

## 2024-07-15 NOTE — PROGRESS NOTES
"     New Patient Consult      Date: 07/15/2024   Patient Name: Adolfo Suazo Jr.  MRN: [id number]  : 1950     Primary Care Provider: Sachin Edwards DO    Chief Complaint   Patient presents with    Constipation     History of Present Illness: Adolfo Suazo Jr. is a 73 y.o. male who is here today to establish care with gastroenterology for constipation.     He went to the ER due to constipation about a month ago and was told he was impacted. They tried laxatives, suppositories and enemas but it didn't help. He had to go home and \"dig it out\". Since then he has not had any problem with constipation. He had not taken any new medications prior to constipation starting. He denies history of constipation in the past. He had bright red blood \"running down my leg\" after he manually removed the stool, no other episodes of rectal bleeding. Denies abdominal pain, nausea or vomiting. He had a positive Cologuard in 2023 and was scheduled for colonoscopy in January in [place], but he had to cancel. His last colonoscopy was in 2018. No family history of GI malignancy. The patient had bladder cancer 2 years ago but has been treated.     Subjective      Past Medical History:   Diagnosis Date    Arthritis     Cancer 2021    Bladder, Small tumor removed 2021    Diabetes mellitus     History of medical problems Skin cancer shoulder    small basal cell removed. I  moved before follow up.    Hypertension     Losartan, Metoprolol Under control     Past Surgical History:   Procedure Laterality Date    BLADDER SURGERY      COLONOSCOPY      Told to come back in 10 years, previous colonoscopies had several non cancerous small polyps removed    HERNIA REPAIR      INGUINAL HERNIA REPAIR      Double Hernia, no subsequent issues     Family History   Problem Relation Age of Onset    Hypertension Mother     Heart disease Mother         Heart attacks, no surgery    Arthritis Mother         Knee " replacements when she was 80, lived to 96    Hearing loss Mother         Hearing Aides    Hyperlipidemia Mother     Kidney disease Mother         Twisted plumbing on both kidneys.    Stroke Mother         several TIA's in her 80's    Diabetes Father         Type 2    Hypertension Father     Heart disease Father         Heart attacks and  of congestive heart failure    Arthritis Father         General arthritis knees    Hearing loss Father         Hearing Aides    Hyperlipidemia Father     Arthritis Brother         Neck and back issues    Hearing loss Brother         Hearing Aides    Hyperlipidemia Brother     Colon cancer Neg Hx      Social History     Socioeconomic History    Marital status: Single   Tobacco Use    Smoking status: Former     Types: Cigars     Quit date:      Years since quittin.5     Passive exposure: Never    Smokeless tobacco: Never   Vaping Use    Vaping status: Never Used   Substance and Sexual Activity    Alcohol use: Not Currently     Comment: Quit drinking alcohol in     Drug use: Never    Sexual activity: Not Currently     Partners: Female     Comment: n/a       Current Outpatient Medications:     losartan (COZAAR) 25 MG tablet, Take 1 tablet by mouth Daily., Disp: 90 tablet, Rfl: 0    metoprolol succinate XL (TOPROL-XL) 25 MG 24 hr tablet, Take 1 tablet by mouth Daily., Disp: 90 tablet, Rfl: 0    simvastatin (ZOCOR) 40 MG tablet, Take 1 tablet by mouth every night at bedtime., Disp: , Rfl:     bisacodyl (DULCOLAX) 5 MG EC tablet, Take as directed for colon prep, Disp: 4 tablet, Rfl: 0    ketoconazole (NIZORAL) 2 % cream, APPLY A THIN LAYER OF CREAM WITH A Q-TIP TOPICALLY TWICE DAILY TO THE EARS AS NEEDED FOR FLAKING, Disp: , Rfl:     sodium-potassium-magnesium sulfates (Suprep Bowel Prep Kit) 17.5-3.13-1.6 GM/177ML solution oral solution, Use as directed for colonoscopy prep. Patient has instructions., Disp: 177 mL, Rfl: 0     No Known Allergies    The following portions of  the patient's history were reviewed and updated as appropriate: allergies, current medications, past family history, past medical history, past social history, past surgical history and problem list.    Objective     Physical Exam  Vitals and nursing note reviewed.   Constitutional:       General: He is not in acute distress.     Appearance: Normal appearance. He is well-developed.   HENT:      Head: Normocephalic and atraumatic.      Mouth/Throat:      Mouth: Mucous membranes are not pale, not dry and not cyanotic.   Eyes:      General: Lids are normal.   Neck:      Trachea: Trachea normal.   Cardiovascular:      Rate and Rhythm: Normal rate.   Pulmonary:      Effort: Pulmonary effort is normal. No respiratory distress.      Breath sounds: Normal breath sounds.   Abdominal:      Tenderness: There is no abdominal tenderness.   Skin:     General: Skin is warm and dry.   Neurological:      Mental Status: He is alert and oriented to person, place, and time.   Psychiatric:         Mood and Affect: Mood normal.         Speech: Speech normal.         Behavior: Behavior normal. Behavior is cooperative.       Vitals:    07/15/24 1051   BP: 120/78   Pulse: 50   SpO2: 97%   Weight: 100 kg (221 lb)     Body mass index is 31.71 kg/m².     Results Review:   I have reviewed the patient's new clinical and imaging results.    Admission on 05/23/2024, Discharged on 05/23/2024   Component Date Value Ref Range Status    Glucose 05/23/2024 119 (H)  65 - 99 mg/dL Final    BUN 05/23/2024 15  8 - 23 mg/dL Final    Creatinine 05/23/2024 1.00  0.76 - 1.27 mg/dL Final    Sodium 05/23/2024 139  136 - 145 mmol/L Final    Potassium 05/23/2024 3.6  3.5 - 5.2 mmol/L Final    Slight hemolysis detected by analyzer. Result may be falsely elevated.    Chloride 05/23/2024 109 (H)  98 - 107 mmol/L Final    CO2 05/23/2024 22.9  22.0 - 29.0 mmol/L Final    Calcium 05/23/2024 9.0  8.6 - 10.5 mg/dL Final    Total Protein 05/23/2024 5.9 (L)  6.0 - 8.5 g/dL  Final    Albumin 05/23/2024 4.0  3.5 - 5.2 g/dL Final    ALT (SGPT) 05/23/2024 24  1 - 41 U/L Final    AST (SGOT) 05/23/2024 30  1 - 40 U/L Final    Alkaline Phosphatase 05/23/2024 60  39 - 117 U/L Final    Total Bilirubin 05/23/2024 1.0  0.0 - 1.2 mg/dL Final    Globulin 05/23/2024 1.9  gm/dL Final    A/G Ratio 05/23/2024 2.1  g/dL Final    BUN/Creatinine Ratio 05/23/2024 15.0  7.0 - 25.0 Final    Anion Gap 05/23/2024 7.1  5.0 - 15.0 mmol/L Final    eGFR 05/23/2024 79.0  >60.0 mL/min/1.73 Final    Lactate 05/23/2024 1.2  0.5 - 2.0 mmol/L Final    Protime 05/23/2024 14.1  12.3 - 15.1 Seconds Final    INR 05/23/2024 1.04  0.90 - 1.10 Final    WBC 05/23/2024 7.80  3.40 - 10.80 10*3/mm3 Final    RBC 05/23/2024 4.61  4.14 - 5.80 10*6/mm3 Final    Hemoglobin 05/23/2024 14.3  13.0 - 17.7 g/dL Final    Hematocrit 05/23/2024 42.1  37.5 - 51.0 % Final    MCV 05/23/2024 91.3  79.0 - 97.0 fL Final    MCH 05/23/2024 31.0  26.6 - 33.0 pg Final    MCHC 05/23/2024 34.0  31.5 - 35.7 g/dL Final    RDW 05/23/2024 13.4  12.3 - 15.4 % Final    RDW-SD 05/23/2024 45.3  37.0 - 54.0 fl Final    MPV 05/23/2024 11.4  6.0 - 12.0 fL Final    Platelets 05/23/2024 178  140 - 450 10*3/mm3 Final   Office Visit on 05/10/2024   Component Date Value Ref Range Status    Hemoglobin A1C 05/10/2024 5.5  4.5 - 5.7 % Final    Lot Number 05/10/2024 [id number]   Final    Expiration Date 05/10/2024 01/03/2026   Final      Comprehensive Metabolic Panel (10/26/2022 04:55)  CBC & Differential (10/26/2022 04:55)  TSH (10/26/2022 04:55)    CT Angiogram Abdomen Pelvis     Result Date: 5/23/2024  Constipation.  Diverticulosis.  Question rectal wall thickening but no infiltration of the surrounding fat, proctitis in the differential.      Colonoscopy dated 12/18/2018 per Dr. Burt, [facility]  - Mild diverticulosis.  - Small sessile polyp status post polypectomy with cold forceps.  - Sigmoid colon polyp biopsy with fragment of  hyperplastic polyp, no malignancy.  - Colonoscopy in 10 years per the records.    Assessment / Plan      1. Positive colorectal cancer screening using DNA-based stool test  2. Encounter for screening for malignant neoplasm of colon  Colonoscopy in 2018 with 1 polyp removed, hyperplastic. He had a positive Cologuard in October 2023. No family history of colon cancer.   Colonoscopy for screening.     - Case Request; Standing  - Implement Anesthesia Orders Day of Procedure; Standing  - Verify NPO; Standing  - Verify Bowel Prep Was Successful; Standing  - Oxygen Therapy- Nasal Cannula; 2 LPM; Standing  - Obtain Informed Consent; Standing  - sodium chloride 0.9 % infusion  - Case Request  - sodium-potassium-magnesium sulfates (Suprep Bowel Prep Kit) 17.5-3.13-1.6 GM/177ML solution oral solution; Use as directed for colonoscopy prep. Patient has instructions.  Dispense: 177 mL; Refill: 0  - bisacodyl (DULCOLAX) 5 MG EC tablet; Take as directed for colon prep  Dispense: 4 tablet; Refill: 0    3. Constipation, unspecified constipation type  4. Change in bowel habits  5. Abnormal CT scan, colon  He had an episode of constipation in May 2023 that has now resolved. This was a change in bowel habits for him. He is back to having daily soft bowel movements. Basic labs unremarkable. TSH normal. CTAP 5/23/2024 with questionable rectal wall thickening. The patient has a history of bladder cancer a couple of years ago that has been treated.   High fiber, low fat diet with liberal water intake.   Low FODMAP diet.  Miralax and stool softeners daily as needed.  Will schedule colonoscopy for screening/evaluation.    Patient Instructions   High fiber, low fat diet with liberal water intake.   Metamucil 1 packet/scoop daily or fiber gummies/capsules 2-4 per day.   Low FODMAP diet - avoid all dairy. May use lactose free/dairy free alternatives such as almond milk, rice milk, oat milk, etc.   Miralax 17 grams daily for constipation as  needed.  May add stool softeners 2 per day as needed for constipation.   Colonoscopy: The indications, technique, alternatives and potential risk and complications were discussed with the patient including but not limited to bleeding, perforations, missing lesions and anesthetic complications. The patient understands and wishes to proceed with the procedure and has given their verbal consent. Written patient education information was given to the patient.   The patient will call if they have further questions before procedure.       Low-FODMAP Eating Plan    FODMAP stands for fermentable oligosaccharides, disaccharides, monosaccharides, and polyols. These are sugars that are hard for some people to digest. A low-FODMAP eating plan may help some people who have irritable bowel syndrome (IBS) and certain other bowel (intestinal) diseases to manage their symptoms.  This meal plan can be complicated to follow. Work with a diet and nutrition specialist (dietitian) to make a low-FODMAP eating plan that is right for you. A dietitian can help make sure that you get enough nutrition from this diet.  What are tips for following this plan?  Reading food labels  Check labels for hidden FODMAPs such as:  High-fructose syrup.  Honey.  Agave.  Natural fruit flavors.  Onion or garlic powder.  Choose low-FODMAP foods that contain 3-4 grams of fiber per serving.  Check food labels for serving sizes. Eat only one serving at a time to make sure FODMAP levels stay low.  Shopping  Shop with a list of foods that are recommended on this diet and make a meal plan.  Meal planning  Follow a low-FODMAP eating plan for up to 6 weeks, or as told by your health care provider or dietitian.  To follow the eating plan:  Eliminate high-FODMAP foods from your diet completely. Choose only low-FODMAP foods to eat. You will do this for 2-6 weeks.  Gradually reintroduce high-FODMAP foods into your diet one at a time. Most people should wait a few days  before introducing the next new high-FODMAP food into their meal plan. Your dietitian can recommend how quickly you may reintroduce foods.  Keep a daily record of what and how much you eat and drink. Make note of any symptoms that you have after eating.  Review your daily record with a dietitian regularly to identify which foods you can eat and which foods you should avoid.  General tips  Drink enough fluid each day to keep your urine pale yellow.  Avoid processed foods. These often have added sugar and may be high in FODMAPs.  Avoid most dairy products, whole grains, and sweeteners.  Work with a dietitian to make sure you get enough fiber in your diet.  Avoid high FODMAP foods at meals to manage symptoms.    Recommended foods  Fruits  Bananas, oranges, tangerines, genet, limes, blueberries, raspberries, strawberries, grapes, cantaloupe, honeydew melon, kiwi, papaya, passion fruit, and pineapple. Limited amounts of dried cranberries, banana chips, and shredded coconut.  Vegetables  Eggplant, zucchini, cucumber, peppers, green beans, bean sprouts, lettuce, arugula, kale, Swiss chard, spinach, sue greens, bok haris, summer squash, potato, and tomato. Limited amounts of corn, carrot, and sweet potato. Green parts of scallions.  Grains  Gluten-free grains, such as rice, oats, buckwheat, quinoa, corn, polenta, and millet. Gluten-free pasta, bread, or cereal. Rice noodles. Corn tortillas.  Meats and other proteins  Unseasoned beef, pork, poultry, or fish. Eggs. Castañeda. Tofu (firm) and tempeh. Limited amounts of nuts and seeds, such as almonds, walnuts, brazil nuts, pecans, peanuts, nut butters, pumpkin seeds, tasha seeds, and sunflower seeds.  Dairy  Lactose-free milk, yogurt, and kefir. Lactose-free cottage cheese and ice cream. Non-dairy milks, such as almond, coconut, hemp, and rice milk. Non-dairy yogurt. Limited amounts of goat cheese, brie, mozzarella, parmesan, swiss, and other hard cheeses.  Fats and  "oils  Butter-free spreads. Vegetable oils, such as olive, canola, and sunflower oil.  Seasoning and other foods  Artificial sweeteners with names that do not end in \"ol,\" such as aspartame, saccharine, and stevia. Maple syrup, white table sugar, raw sugar, brown sugar, and molasses. Mayonnaise, soy sauce, and tamari. Fresh basil, coriander, parsley, rosemary, and thyme.  Beverages  Water and mineral water. Sugar-sweetened soft drinks. Small amounts of orange juice or cranberry juice. Black and green tea. Most dry mee. Coffee.  The items listed above may not be a complete list of foods and beverages you can eat. Contact a dietitian for more information.    Foods to avoid  Fruits  Fresh, dried, and juiced forms of apple, pear, watermelon, peach, plum, cherries, apricots, blackberries, boysenberries, figs, nectarines, and twan. Avocado.  Vegetables  Chicory root, artichoke, asparagus, cabbage, snow peas, Ira sprouts, broccoli, sugar snap peas, mushrooms, celery, and cauliflower. Onions, garlic, leeks, and the white part of scallions.  Grains  Wheat, including kamut, durum, and semolina. Barley and bulgur. Couscous. Wheat-based cereals. Wheat noodles, bread, crackers, and pastries.  Meats and other proteins  Fried or fatty meat. Sausage. Cashews and pistachios. Soybeans, baked beans, black beans, chickpeas, kidney beans, carrington beans, navy beans, lentils, black-eyed peas, and split peas.  Dairy  Milk, yogurt, ice cream, and soft cheese. Cream and sour cream. Milk-based sauces. Custard. Buttermilk. Soy milk.  Seasoning and other foods  Any sugar-free gum or candy. Foods that contain artificial sweeteners such as sorbitol, mannitol, isomalt, or xylitol. Foods that contain honey, high-fructose corn syrup, or agave. Bouillon, vegetable stock, beef stock, and chicken stock. Garlic and onion powder. Condiments made with onion, such as hummus, chutney, pickles, relish, salad dressing, and salsa. Tomato " paste.  Beverages  Chicory-based drinks. Coffee substitutes. Chamomile tea. Fennel tea. Sweet or fortified mee such as port or anil. Diet soft drinks made with isomalt, mannitol, maltitol, sorbitol, or xylitol. Apple, pear, and twan juice. Juices with high-fructose corn syrup.  The items listed above may not be a complete list of foods and beverages you should avoid. Contact a dietitian for more information.    Summary  FODMAP stands for fermentable oligosaccharides, disaccharides, monosaccharides, and polyols. These are sugars that are hard for some people to digest.  A low-FODMAP eating plan is a short-term diet that helps to ease symptoms of certain bowel diseases.  The eating plan usually lasts up to 6 weeks. After that, high-FODMAP foods are reintroduced gradually and one at a time. This can help you find out which foods may be causing symptoms.  A low-FODMAP eating plan can be complicated. It is best to work with a dietitian who has experience with this type of plan.  This information is not intended to replace advice given to you by your health care provider. Make sure you discuss any questions you have with your health care provider.  Document Revised: 05/06/2021 Document Reviewed: 05/06/2021  Elsevier Patient Education © 2023 Elsevier Inc.     Jaclyn Troncoso, APRN  7/15/2024    Please note that portions of this note may have been completed with a voice recognition program.

## 2024-07-19 ENCOUNTER — PATIENT ROUNDING (BHMG ONLY) (OUTPATIENT)
Dept: GASTROENTEROLOGY | Facility: CLINIC | Age: 74
End: 2024-07-19
Payer: MEDICARE

## 2024-07-19 NOTE — PROGRESS NOTES
July 19, 2024    Hello, may I speak with Adolfo Suazo Jr.?    My name is Aylin     I am  with [facility]  [address]  [place] [place] [address] [phone].    Before we get started may I verify your date of birth? 1950    I am calling to officially welcome you to our practice and ask about your recent visit. Is this a good time to talk? yes    Tell me about your visit with us. What things went well?  the visit went good       We're always looking for ways to make our patients' experiences even better. Do you have recommendations on ways we may improve?  no    Overall were you satisfied with your first visit to our practice? yes       I appreciate you taking the time to speak with me today. Is there anything else I can do for you? no      Thank you, and have a great day.

## 2024-07-24 PROBLEM — Z12.11 ENCOUNTER FOR SCREENING FOR MALIGNANT NEOPLASM OF COLON: Status: ACTIVE | Noted: 2024-07-15

## 2024-08-14 ENCOUNTER — TELEPHONE (OUTPATIENT)
Dept: GASTROENTEROLOGY | Facility: CLINIC | Age: 74
End: 2024-08-14
Payer: MEDICARE

## 2024-08-14 NOTE — TELEPHONE ENCOUNTER
----- Message from Carolina DELAROSA sent at 8/14/2024  8:39 AM EDT -----  Regarding: FW: bowel prep    ----- Message -----  From: Prema Hemphill RN  Sent: 8/13/2024   5:17 PM EDT  To: [facility]  Subject: bowel prep                                       Patient has questions about his bowel prep that I am unable to answer.  Could someone please reach out to him?  Thank you.

## 2024-08-15 ENCOUNTER — ANESTHESIA EVENT (OUTPATIENT)
Dept: GASTROENTEROLOGY | Facility: HOSPITAL | Age: 74
End: 2024-08-15
Payer: MEDICARE

## 2024-08-15 ENCOUNTER — ANESTHESIA (OUTPATIENT)
Dept: GASTROENTEROLOGY | Facility: HOSPITAL | Age: 74
End: 2024-08-15
Payer: MEDICARE

## 2024-08-15 ENCOUNTER — HOSPITAL ENCOUNTER (OUTPATIENT)
Facility: HOSPITAL | Age: 74
Setting detail: HOSPITAL OUTPATIENT SURGERY
Discharge: HOME | End: 2024-08-15
Attending: INTERNAL MEDICINE | Admitting: INTERNAL MEDICINE
Payer: MEDICARE

## 2024-08-15 VITALS
TEMPERATURE: 97.3 F | WEIGHT: 216 LBS | HEIGHT: 70 IN | DIASTOLIC BLOOD PRESSURE: 80 MMHG | OXYGEN SATURATION: 98 % | BODY MASS INDEX: 30.92 KG/M2 | RESPIRATION RATE: 20 BRPM | SYSTOLIC BLOOD PRESSURE: 155 MMHG | HEART RATE: 55 BPM

## 2024-08-15 DIAGNOSIS — Z12.11 ENCOUNTER FOR SCREENING FOR MALIGNANT NEOPLASM OF COLON: ICD-10-CM

## 2024-08-15 PROCEDURE — 25010000002 PROPOFOL 10 MG/ML EMULSION: Performed by: NURSE ANESTHETIST, CERTIFIED REGISTERED

## 2024-08-15 PROCEDURE — 88305 TISSUE EXAM BY PATHOLOGIST: CPT

## 2024-08-15 PROCEDURE — 45380 COLONOSCOPY AND BIOPSY: CPT | Performed by: INTERNAL MEDICINE

## 2024-08-15 PROCEDURE — 45385 COLONOSCOPY W/LESION REMOVAL: CPT | Performed by: INTERNAL MEDICINE

## 2024-08-15 PROCEDURE — 25010000002 FENTANYL CITRATE (PF) 50 MCG/ML SOLUTION PREFILLED SYRINGE: Performed by: NURSE ANESTHETIST, CERTIFIED REGISTERED

## 2024-08-15 PROCEDURE — 25810000003 SODIUM CHLORIDE 0.9 % SOLUTION: Performed by: NURSE PRACTITIONER

## 2024-08-15 RX ORDER — SIMETHICONE 40MG/0.6ML
SUSPENSION, DROPS(FINAL DOSAGE FORM)(ML) ORAL AS NEEDED
Status: DISCONTINUED | OUTPATIENT
Start: 2024-08-15 | End: 2024-08-15 | Stop reason: HOSPADM

## 2024-08-15 RX ORDER — PROPOFOL 10 MG/ML
VIAL (ML) INTRAVENOUS AS NEEDED
Status: DISCONTINUED | OUTPATIENT
Start: 2024-08-15 | End: 2024-08-15 | Stop reason: SURG

## 2024-08-15 RX ORDER — SODIUM CHLORIDE 9 MG/ML
70 INJECTION, SOLUTION INTRAVENOUS CONTINUOUS PRN
Status: DISCONTINUED | OUTPATIENT
Start: 2024-08-15 | End: 2024-08-15 | Stop reason: HOSPADM

## 2024-08-15 RX ORDER — FENTANYL CITRATE 50 UG/ML
INJECTION, SOLUTION INTRAMUSCULAR; INTRAVENOUS AS NEEDED
Status: DISCONTINUED | OUTPATIENT
Start: 2024-08-15 | End: 2024-08-15 | Stop reason: SURG

## 2024-08-15 RX ADMIN — SODIUM CHLORIDE 70 ML/HR: 9 INJECTION, SOLUTION INTRAVENOUS at 09:14

## 2024-08-15 RX ADMIN — PROPOFOL 100 MG: 10 INJECTION, EMULSION INTRAVENOUS at 10:38

## 2024-08-15 RX ADMIN — PROPOFOL 100 MG: 10 INJECTION, EMULSION INTRAVENOUS at 10:21

## 2024-08-15 RX ADMIN — LIDOCAINE HYDROCHLORIDE 60 MG: 20 INJECTION, SOLUTION INTRAVENOUS at 10:21

## 2024-08-15 RX ADMIN — PROPOFOL 100 MG: 10 INJECTION, EMULSION INTRAVENOUS at 10:27

## 2024-08-15 RX ADMIN — FENTANYL CITRATE 50 MCG: 50 INJECTION, SOLUTION INTRAMUSCULAR; INTRAVENOUS at 10:21

## 2024-08-15 RX ADMIN — PROPOFOL 100 MG: 10 INJECTION, EMULSION INTRAVENOUS at 10:46

## 2024-08-15 NOTE — ANESTHESIA PREPROCEDURE EVALUATION
Anesthesia Evaluation     Patient summary reviewed and Nursing notes reviewed   NPO Solid Status: > 8 hours  NPO Liquid Status: > 8 hours           Airway   Mallampati: II  TM distance: >3 FB  Neck ROM: full  Possible difficult intubation  Dental - normal exam     Pulmonary - normal exam   Cardiovascular - normal exam    (+) hypertension well controlled 2 medications or greater      Neuro/Psych  (+) syncope, numbness, psychiatric history Depression  GI/Hepatic/Renal/Endo    (+) obesity, GERD well controlled, diabetes mellitus type 2 well controlled    Musculoskeletal     Abdominal  - normal exam   Substance History      OB/GYN          Other   arthritis,   history of cancer remission                Anesthesia Plan    ASA 3     MAC     intravenous induction     Anesthetic plan, risks, benefits, and alternatives have been provided, discussed and informed consent has been obtained with: patient.  Pre-procedure education provided  Plan discussed with CRNA.    CODE STATUS:

## 2024-08-15 NOTE — H&P
[facility]  HISTORY AND PHYSICAL    Patient Name: Adolfo Suazo Jr.  : 1950  MRN: [id number]    Chief Complaint:   For screening colonoscopy    History Of Presenting Illness:    Positive cologuard oct 2013  Rectal bleeding   constipation    Past Medical History:   Diagnosis Date    Arthritis     Cancer 2021    Bladder, Small tumor removed 2021    Colon polyp     Diabetes mellitus     diet controlled, patient was never placed on medications    History of medical problems Skin cancer shoulder    small basal cell removed. I  moved before follow up.    Hypertension 2010    Losartan, Metoprolol Under control       Past Surgical History:   Procedure Laterality Date    ANAL FISTULA REPAIR      COLONOSCOPY      Told to come back in 10 years, previous colonoscopies had several non cancerous small polyps removed    INGUINAL HERNIA REPAIR      Double Hernia, no subsequent issues    PILONIDAL CYSTECTOMY      TRANSURETHRAL RESECTION OF BLADDER TUMOR      WISDOM TOOTH EXTRACTION         Social History     Socioeconomic History    Marital status: Single   Tobacco Use    Smoking status: Former     Types: Cigars     Quit date:      Years since quittin.6     Passive exposure: Never    Smokeless tobacco: Never   Vaping Use    Vaping status: Never Used   Substance and Sexual Activity    Alcohol use: Not Currently     Comment: Quit drinking alcohol in     Drug use: Never    Sexual activity: Not Currently     Partners: Female     Comment: n/a       Family History   Problem Relation Age of Onset    Hypertension Mother     Heart disease Mother         Heart attacks, no surgery    Arthritis Mother         Knee replacements when she was 80, lived to 96    Hearing loss Mother         Hearing Aides    Hyperlipidemia Mother     Kidney disease Mother         Twisted plumbing on both kidneys.    Stroke Mother         several TIA's in her 80's    Diabetes Father         Type 2     Hypertension Father     Heart disease Father         Heart attacks and  of congestive heart failure    Arthritis Father         General arthritis knees    Hearing loss Father         Hearing Aides    Hyperlipidemia Father     Arthritis Brother         Neck and back issues    Hearing loss Brother         Hearing Aides    Hyperlipidemia Brother     Colon cancer Neg Hx        Prior to Admission Medications:  Medications Prior to Admission   Medication Sig Dispense Refill Last Dose    bisacodyl (DULCOLAX) 5 MG EC tablet Take as directed for colon prep 4 tablet 0 2024    ketoconazole (NIZORAL) 2 % cream APPLY A THIN LAYER OF CREAM WITH A Q-TIP TOPICALLY TWICE DAILY TO THE EARS AS NEEDED FOR FLAKING   2024    losartan (COZAAR) 25 MG tablet Take 1 tablet by mouth Daily. 90 tablet 0 2024    metoprolol succinate XL (TOPROL-XL) 25 MG 24 hr tablet Take 1 tablet by mouth Daily. 90 tablet 0 2024 at 2000    simvastatin (ZOCOR) 40 MG tablet Take 1 tablet by mouth every night at bedtime.   2024    sodium-potassium-magnesium sulfates (Suprep Bowel Prep Kit) 17.5-3.13-1.6 GM/177ML solution oral solution Use as directed for colonoscopy prep. Patient has instructions. 177 mL 0 8/15/2024       Allergies:  No Known Allergies     Vitals: Temp:  [97.6 °F (36.4 °C)] 97.6 °F (36.4 °C)  Heart Rate:  [51] 51  Resp:  [18] 18  BP: (182)/(90) 182/90    Review Of Systems:  Constitutional:  Negative for chills, fever, and unexpected weight change.  Respiratory:  Negative for cough, chest tightness, shortness of breath, and wheezing.  Cardiovascular:  Negative for chest pain, palpitations, and leg swelling.  Gastrointestinal:  Negative for abdominal distention, abdominal pain, nausea, vomiting.  Neurological:  Negative for weakness, numbness, and headaches.     Physical Exam:    General Appearance:  Alert, cooperative, in no acute distress.   Lungs:   Clear to auscultation, respirations regular, even and                  unlabored.   Heart:  Regular rhythm and normal rate.   Abdomen:   Normal bowel sounds, no masses, no organomegaly. Soft, nontender, nondistended   Neurologic: Alert and oriented x 3. Moves all four limbs equally       Assessment & Plan     Assessment:  Principal Problem:    Encounter for screening for malignant neoplasm of colon      Plan: COLONOSCOPY (N/A)     Narcisa Mehta MD  8/15/2024

## 2024-08-15 NOTE — ANESTHESIA POSTPROCEDURE EVALUATION
Patient: Adolfo Suazo Jr.    Procedure Summary       Date: 08/15/24 Room / Location: [facility] ENDOSCOPY 2 / [facility] ENDOSCOPY    Anesthesia Start: 1019 Anesthesia Stop: 1106    Procedure: COLONOSCOPY with biopsy and polypectomy (Anus) Diagnosis:       Encounter for screening for malignant neoplasm of colon      (Encounter for screening for malignant neoplasm of colon [Z12.11])    Surgeons: Narcisa Mehta MD Provider: Ralph Rowan CRNA    Anesthesia Type: MAC ASA Status: 3            Anesthesia Type: MAC    Vitals  No vitals data found for the desired time range.          Post Anesthesia Care and Evaluation    Patient location during evaluation: bedside  Patient participation: complete - patient participated  Level of consciousness: awake  Pain score: 0  Pain management: adequate    Airway patency: patent  Anesthetic complications: No anesthetic complications  PONV Status: controlled  Cardiovascular status: acceptable and stable  Respiratory status: acceptable and room air  Hydration status: acceptable    Comments: Vital signs as noted in nursing documentation as per protocol

## 2024-08-15 NOTE — DISCHARGE INSTRUCTIONS
Rest today  No pushing,pulling,tugging,heavy lifting, or strenuous activity   No major decision making,driving,or drinking alcoholic beverages for 24 hours due to the sedation you received  Always use good hand hygiene/washing technique  No driving on pain medication.    To assist you in voiding:  Drink plenty of fluids  Listen to running water while attempting to void.    If you are unable to urinate and you have an uncomfortable urge to void or it has been   6 hours since you were discharged, return to the Emergency Room.    - Discharge patient to home (ambulatory).   - High fiber diet.   - Continue present medications.   - Metamucil fiber pills 2 po per day  - Await pathology results.   - Repeat colonoscopy in 7 years for surveillance depend on health status.   - Return to GI office in 8 weeks.

## 2024-08-16 LAB — REF LAB TEST METHOD: NORMAL

## 2024-08-18 DIAGNOSIS — I10 ESSENTIAL HYPERTENSION: Chronic | ICD-10-CM

## 2024-08-20 RX ORDER — METOPROLOL SUCCINATE 25 MG/1
25 TABLET, EXTENDED RELEASE ORAL DAILY
Qty: 90 TABLET | Refills: 0 | Status: SHIPPED | OUTPATIENT
Start: 2024-08-20

## 2024-08-26 DIAGNOSIS — I10 ESSENTIAL HYPERTENSION: Chronic | ICD-10-CM

## 2024-08-26 RX ORDER — SIMVASTATIN 40 MG
40 TABLET ORAL
Qty: 90 TABLET | Refills: 3 | Status: SHIPPED | OUTPATIENT
Start: 2024-08-26

## 2024-08-26 RX ORDER — LOSARTAN POTASSIUM 25 MG/1
25 TABLET ORAL DAILY
Qty: 90 TABLET | Refills: 3 | Status: SHIPPED | OUTPATIENT
Start: 2024-08-26

## 2024-10-15 ENCOUNTER — OFFICE VISIT (OUTPATIENT)
Dept: FAMILY MEDICINE CLINIC | Facility: CLINIC | Age: 74
End: 2024-10-15
Payer: MEDICARE

## 2024-10-15 VITALS
BODY MASS INDEX: 31.32 KG/M2 | WEIGHT: 218.8 LBS | DIASTOLIC BLOOD PRESSURE: 70 MMHG | HEART RATE: 64 BPM | SYSTOLIC BLOOD PRESSURE: 118 MMHG | HEIGHT: 70 IN | OXYGEN SATURATION: 99 %

## 2024-10-15 DIAGNOSIS — I10 ESSENTIAL HYPERTENSION: Chronic | ICD-10-CM

## 2024-10-15 DIAGNOSIS — M54.16 CHRONIC LEFT-SIDED LUMBAR RADICULOPATHY: Primary | ICD-10-CM

## 2024-10-15 DIAGNOSIS — E78.5 DYSLIPIDEMIA: Chronic | ICD-10-CM

## 2024-10-15 PROCEDURE — 99214 OFFICE O/P EST MOD 30 MIN: CPT | Performed by: FAMILY MEDICINE

## 2024-10-15 PROCEDURE — 3074F SYST BP LT 130 MM HG: CPT | Performed by: FAMILY MEDICINE

## 2024-10-15 PROCEDURE — G2211 COMPLEX E/M VISIT ADD ON: HCPCS | Performed by: FAMILY MEDICINE

## 2024-10-15 PROCEDURE — 3078F DIAST BP <80 MM HG: CPT | Performed by: FAMILY MEDICINE

## 2024-10-15 PROCEDURE — 1126F AMNT PAIN NOTED NONE PRSNT: CPT | Performed by: FAMILY MEDICINE

## 2024-10-15 RX ORDER — METHOCARBAMOL 500 MG/1
500 TABLET, FILM COATED ORAL 3 TIMES DAILY PRN
Qty: 60 TABLET | Refills: 2 | Status: SHIPPED | OUTPATIENT
Start: 2024-10-15

## 2024-10-15 RX ORDER — MELOXICAM 7.5 MG/1
7.5 TABLET ORAL DAILY
Qty: 90 TABLET | Refills: 2 | Status: SHIPPED | OUTPATIENT
Start: 2024-10-15

## 2024-10-15 NOTE — PROGRESS NOTES
Established Patient        Chief Complaint:   Chief Complaint   Patient presents with    Back Pain     Pt states he has been having intermittent lower back pain for the past year. He states the pain radiates down the left leg        Adolfo Suazo Jr. is a 73 y.o. male    History of Present Illness:   Answers submitted by the patient for this visit:  Primary Reason for Visit (Submitted on 10/10/2024)  What is the primary reason for your visit?: Back Pain  Back Pain Questionnaire (Submitted on 10/10/2024)  Chief Complaint: Back pain  Chronicity: chronic  Onset: more than 1 month ago  Frequency: daily  Progression since onset: coming and going  Pain location: gluteal, sacro-iliac  Pain quality: aching, shooting  Radiates to: left buttock, left thigh  Pain - numeric: 7/10  Pain is: worse during the day  Aggravated by: standing, twisting  Stiffness is present: all day  abdominal pain: No  bladder incontinence: No  bowel incontinence: No  chest pain: No  dysuria: No  fever: No  leg pain: Yes  numbness: No  paresthesias: No  pelvic pain: No  perianal numbness: No  tingling: No  weakness: No  weight loss: No  Risk factors: history of cancer    Here today in follow-up of his hypertension and dyslipidemia.    Denies any problems with his current medication regimen.    Denies chest pain, syncope, palpitations or vertigo.  Denies fever, chills or night sweats.  Maintains an active lifestyle, balanced dietary intake; reports good hydration habits.  Denies hematuria/dysuria.  Denies any BRB/BTS.  No new rashes.  Denies orthopnea, epistaxis or hemoptysis.    Patient continues to utilize multifaceted weight loss program, involving both dietary and exercise modifications.  He denies any single episode of trauma or injury, however has developed a more persistent lumbar discomfort, typically has resolved in the past with some activity modifications as well as supportive care.    He denies any saddle anesthesia  "or bowel incontinence.  Pain is predominantly located to the left side, with some radicular qualities.  Worsened with certain activities and prolonged standing/ambulation.    Subjective     The following portions of the patient's history were reviewed and updated as appropriate: allergies, current medications, past family history, past medical history, past social history, past surgical history and problem list.    No Known Allergies    Review of Systems:    Constitutional: Negative for fever. Negative for chills, diaphoresis, fatigue and unexpected weight change.   HENT: No dysphagia; no changes to vision/hearing/smell/taste; no epistaxis  Eyes: Negative for redness and visual disturbance.   Respiratory: negative for shortness of breath. Negative for chest pain . Negative for cough and chest tightness.   Cardiovascular: Negative for chest pain and palpitations.   Gastrointestinal: Negative for abdominal distention, abdominal pain and blood in stool.   Endocrine: Negative for cold intolerance and heat intolerance.   Genitourinary: Negative for difficulty urinating, dysuria and frequency.   Musculoskeletal: As per above.  Skin: Negative for color change, rash and wound.   Neurological: Negative for syncope, weakness and headaches.   Hematological: Negative for adenopathy. Does not bruise/bleed easily.   Psychiatric/Behavioral: Negative for confusion. The patient is not nervous/anxious.    Objective     Physical Exam   Vital Signs: /70   Pulse 64   Ht 177.8 cm (70\")   Wt 99.2 kg (218 lb 12.8 oz)   SpO2 99%   BMI 31.39 kg/m²         General Appearance: alert, oriented x 3, no acute distress.  Skin: warm and dry.   HEENT: Atraumatic.  pupils round and reactive to light and accommodation, oral mucosa pink and moist.  Nares patent without epistaxis.  External auditory canals are patent tympanic membranes intact.  Neck: supple, no JVD, trachea midline.  No thyromegaly  Lungs: CTA, unlabored breathing " effort.  Heart: RRR, normal S1 and S2, no S3, no rub.  Abdomen: soft, non-tender, no palpable bladder, present bowel sounds to auscultation ×4.  No guarding or rigidity.  Extremities: no clubbing, cyanosis or edema.  Good range of motion actively and passively.  Symmetric muscle strength and development.  No leg length discrepancies.  Ambulates independently, however slightly antalgic.  Quadriceps mechanism intact, normal dorsiflexion/plantarflexion of the feet.  Logroll negative.  There is some paravertebral muscular spasticity noted to the left lumbar spine.  Straight leg raise weakly positive sitting/supine.  Neuro: normal speech and mental status.  Cranial nerves II through XII intact.  No anosmia. DTR 2+; proprioception intact.  No focal motor/sensory deficits.    Advance Care Planning   ACP discussion was held with the patient during this visit. Patient does not have an advance directive, information provided.       Assessment and Plan      Assessment/Plan:   Diagnoses and all orders for this visit:    1. Chronic left-sided lumbar radiculopathy (Primary)  -     XR Spine Lumbar Complete With Flex & Ext  -     meloxicam (Mobic) 7.5 MG tablet; Take 1 tablet by mouth Daily.  Dispense: 90 tablet; Refill: 2  -     methocarbamol (ROBAXIN) 500 MG tablet; Take 1 tablet by mouth 3 (Three) Times a Day As Needed for Muscle Spasms.  Dispense: 60 tablet; Refill: 2    2. Essential hypertension    3. Dyslipidemia      Surveillance labs at f/u visit.      Vital signs demonstrate hemodynamic stability, blood pressure is at goal.    Continue statin therapy.    Adding once daily meloxicam.  Patient will use for 2-week intervals with exacerbations of his back.  He is to notify the office should develop any ill effects to the medication.  I have added as needed use methocarbamol additionally.  He can utilize activity modifications, as well as continued use of moist heat therapy and ice compress to affected area.    Stress view  x-rays today demonstrate diffuse degenerative changes of the lumbar spine, no acute areas of concern, nor any findings of instability.  If patient remains unresponsive to above therapy, consider referral to pain specialist for facet injections, as his x-rays do demonstrate facet arthropathy.    Discussion Summary:    Discussed plan of care in detail with pt today; pt verb understanding and agrees.    I spent 35 minutes caring for Adolfo on this date of service. This time includes time spent by me in the following activities:preparing for the visit, performing a medically appropriate examination and/or evaluation , counseling and educating the patient/family/caregiver, ordering medications, tests, or procedures, documenting information in the medical record, and care coordination    I confirm accuracy of unchanged data/findings which have been carried forward from previous visit, as well as I have updated appropriately those that have changed.      Follow up:  Return in about 3 months (around 1/15/2025) for Recheck, Med Change/New Meds.     There are no Patient Instructions on file for this visit.    Sachin Edwards DO  10/15/24  10:57 EDT

## 2024-10-30 ENCOUNTER — OFFICE VISIT (OUTPATIENT)
Dept: GASTROENTEROLOGY | Facility: CLINIC | Age: 74
End: 2024-10-30
Payer: MEDICARE

## 2024-10-30 VITALS
SYSTOLIC BLOOD PRESSURE: 104 MMHG | HEART RATE: 85 BPM | WEIGHT: 226 LBS | DIASTOLIC BLOOD PRESSURE: 60 MMHG | BODY MASS INDEX: 32.43 KG/M2 | OXYGEN SATURATION: 98 %

## 2024-10-30 DIAGNOSIS — D12.2 ADENOMATOUS POLYP OF ASCENDING COLON: ICD-10-CM

## 2024-10-30 DIAGNOSIS — K59.00 CONSTIPATION, UNSPECIFIED CONSTIPATION TYPE: Primary | ICD-10-CM

## 2024-10-30 DIAGNOSIS — K52.89 STERCORAL COLITIS: ICD-10-CM

## 2024-10-30 DIAGNOSIS — R19.4 CHANGE IN BOWEL HABITS: ICD-10-CM

## 2024-10-30 NOTE — PATIENT INSTRUCTIONS
High fiber, low fat diet with liberal water intake.   Metamucil 1 packet/scoop daily or fiber gummies/capsules 2-4 per day.   Low FODMAP diet - avoid all dairy. May use lactose free/dairy free alternatives such as almond milk, rice milk, oat milk, etc.   Miralax 17 grams daily for constipation as needed.  May add stool softeners 2 per day as needed for constipation.   Colonoscopy for surveillance in 7 years, August 2031, per current ACG guidelines.   Follow up: 7 year or sooner if needed        Low-FODMAP Eating Plan    FODMAP stands for fermentable oligosaccharides, disaccharides, monosaccharides, and polyols. These are sugars that are hard for some people to digest. A low-FODMAP eating plan may help some people who have irritable bowel syndrome (IBS) and certain other bowel (intestinal) diseases to manage their symptoms.  This meal plan can be complicated to follow. Work with a diet and nutrition specialist (dietitian) to make a low-FODMAP eating plan that is right for you. A dietitian can help make sure that you get enough nutrition from this diet.  What are tips for following this plan?  Reading food labels  Check labels for hidden FODMAPs such as:  High-fructose syrup.  Honey.  Agave.  Natural fruit flavors.  Onion or garlic powder.  Choose low-FODMAP foods that contain 3-4 grams of fiber per serving.  Check food labels for serving sizes. Eat only one serving at a time to make sure FODMAP levels stay low.  Shopping  Shop with a list of foods that are recommended on this diet and make a meal plan.  Meal planning  Follow a low-FODMAP eating plan for up to 6 weeks, or as told by your health care provider or dietitian.  To follow the eating plan:  Eliminate high-FODMAP foods from your diet completely. Choose only low-FODMAP foods to eat. You will do this for 2-6 weeks.  Gradually reintroduce high-FODMAP foods into your diet one at a time. Most people should wait a few days before introducing the next new  high-FODMAP food into their meal plan. Your dietitian can recommend how quickly you may reintroduce foods.  Keep a daily record of what and how much you eat and drink. Make note of any symptoms that you have after eating.  Review your daily record with a dietitian regularly to identify which foods you can eat and which foods you should avoid.  General tips  Drink enough fluid each day to keep your urine pale yellow.  Avoid processed foods. These often have added sugar and may be high in FODMAPs.  Avoid most dairy products, whole grains, and sweeteners.  Work with a dietitian to make sure you get enough fiber in your diet.  Avoid high FODMAP foods at meals to manage symptoms.     Recommended foods  Fruits  Bananas, oranges, tangerines, genet, limes, blueberries, raspberries, strawberries, grapes, cantaloupe, honeydew melon, kiwi, papaya, passion fruit, and pineapple. Limited amounts of dried cranberries, banana chips, and shredded coconut.  Vegetables  Eggplant, zucchini, cucumber, peppers, green beans, bean sprouts, lettuce, arugula, kale, Swiss chard, spinach, sue greens, bok haris, summer squash, potato, and tomato. Limited amounts of corn, carrot, and sweet potato. Green parts of scallions.  Grains  Gluten-free grains, such as rice, oats, buckwheat, quinoa, corn, polenta, and millet. Gluten-free pasta, bread, or cereal. Rice noodles. Corn tortillas.  Meats and other proteins  Unseasoned beef, pork, poultry, or fish. Eggs. Castañeda. Tofu (firm) and tempeh. Limited amounts of nuts and seeds, such as almonds, walnuts, brazil nuts, pecans, peanuts, nut butters, pumpkin seeds, tasha seeds, and sunflower seeds.  Dairy  Lactose-free milk, yogurt, and kefir. Lactose-free cottage cheese and ice cream. Non-dairy milks, such as almond, coconut, hemp, and rice milk. Non-dairy yogurt. Limited amounts of goat cheese, brie, mozzarella, parmesan, swiss, and other hard cheeses.  Fats and oils  Butter-free spreads. Vegetable oils,  "such as olive, canola, and sunflower oil.  Seasoning and other foods  Artificial sweeteners with names that do not end in \"ol,\" such as aspartame, saccharine, and stevia. Maple syrup, white table sugar, raw sugar, brown sugar, and molasses. Mayonnaise, soy sauce, and tamari. Fresh basil, coriander, parsley, rosemary, and thyme.  Beverages  Water and mineral water. Sugar-sweetened soft drinks. Small amounts of orange juice or cranberry juice. Black and green tea. Most dry mee. Coffee.  The items listed above may not be a complete list of foods and beverages you can eat. Contact a dietitian for more information.     Foods to avoid  Fruits  Fresh, dried, and juiced forms of apple, pear, watermelon, peach, plum, cherries, apricots, blackberries, boysenberries, figs, nectarines, and twan. Avocado.  Vegetables  Chicory root, artichoke, asparagus, cabbage, snow peas, [place] sprouts, broccoli, sugar snap peas, mushrooms, celery, and cauliflower. Onions, garlic, leeks, and the white part of scallions.  Grains  Wheat, including kamut, durum, and semolina. Barley and bulgur. Couscous. Wheat-based cereals. Wheat noodles, bread, crackers, and pastries.  Meats and other proteins  Fried or fatty meat. Sausage. Cashews and pistachios. Soybeans, baked beans, black beans, chickpeas, kidney beans, carrington beans, navy beans, lentils, black-eyed peas, and split peas.  Dairy  Milk, yogurt, ice cream, and soft cheese. Cream and sour cream. Milk-based sauces. Custard. Buttermilk. Soy milk.  Seasoning and other foods  Any sugar-free gum or candy. Foods that contain artificial sweeteners such as sorbitol, mannitol, isomalt, or xylitol. Foods that contain honey, high-fructose corn syrup, or agave. Bouillon, vegetable stock, beef stock, and chicken stock. Garlic and onion powder. Condiments made with onion, such as hummus, chutney, pickles, relish, salad dressing, and salsa. Tomato paste.  Beverages  Chicory-based drinks. Coffee substitutes. " Chamomile tea. Fennel tea. Sweet or fortified mee such as port or anil. Diet soft drinks made with isomalt, mannitol, maltitol, sorbitol, or xylitol. Apple, pear, and twan juice. Juices with high-fructose corn syrup.  The items listed above may not be a complete list of foods and beverages you should avoid. Contact a dietitian for more information.     Summary  FODMAP stands for fermentable oligosaccharides, disaccharides, monosaccharides, and polyols. These are sugars that are hard for some people to digest.  A low-FODMAP eating plan is a short-term diet that helps to ease symptoms of certain bowel diseases.  The eating plan usually lasts up to 6 weeks. After that, high-FODMAP foods are reintroduced gradually and one at a time. This can help you find out which foods may be causing symptoms.  A low-FODMAP eating plan can be complicated. It is best to work with a dietitian who has experience with this type of plan.  This information is not intended to replace advice given to you by your health care provider. Make sure you discuss any questions you have with your health care provider.  Document Revised: 05/06/2021 Document Reviewed: 05/06/2021  [employer] Patient Education © 2023 [employer]

## 2024-10-30 NOTE — PROGRESS NOTES
"     Follow Up Note     Date: 10/30/2024   Patient Name: Adolfo Suazo Jr.  MRN: [id number]  : 1950     Primary Care Provider: Sachin Edwards DO     Chief Complaint   Patient presents with    Follow-up     After colonoscopy     10/30/2024  History of Present Illness  The patient is a 73-year-old male here for a follow-up to discuss results after a colonoscopy.    He reports no complications following the procedure. He has been doing well. He takes Metamucil daily and does not experience any constipation, diarrhea, abdominal pain, nausea, vomiting, or reflux. No history of GI bleeding.     Interval History:  7/15/2024  He went to the ER due to constipation about a month ago and was told he was impacted. They tried laxatives, suppositories and enemas but it didn't help. He had to go home and \"dig it out\". Since then he has not had any problem with constipation. He had not taken any new medications prior to constipation starting. He denies history of constipation in the past. He had bright red blood \"running down my leg\" after he manually removed the stool, no other episodes of rectal bleeding. Denies abdominal pain, nausea or vomiting. He had a positive Cologuard in 2023 and was scheduled for colonoscopy in January in [place], but he had to cancel. His last colonoscopy was in 2018. No family history of GI malignancy. The patient had bladder cancer 2 years ago but has been treated.     Subjective      Past Medical History:   Diagnosis Date    Arthritis     Cancer 2021    Bladder, Small tumor removed 2021    Colon polyp 2024    Diabetes mellitus     diet controlled, patient was never placed on medications    History of medical problems Skin cancer shoulder    small basal cell removed. I  moved before follow up.    Hypertension     Losartan, Metoprolol Under control     Past Surgical History:   Procedure Laterality Date    ANAL FISTULA REPAIR      COLONOSCOPY  2018    Told " to come back in 10 years, previous colonoscopies had several non cancerous small polyps removed    COLONOSCOPY N/A 8/15/2024    Procedure: COLONOSCOPY with biopsy and polypectomy;  Surgeon: Narcisa Mehta MD;  Location: [facility];  Service: Gastroenterology;  Laterality: N/A;    INGUINAL HERNIA REPAIR      Double Hernia, no subsequent issues    PILONIDAL CYSTECTOMY      TRANSURETHRAL RESECTION OF BLADDER TUMOR      WISDOM TOOTH EXTRACTION       Family History   Problem Relation Age of Onset    Hypertension Mother     Heart disease Mother         Heart attacks, no surgery    Arthritis Mother         Knee replacements when she was 80, lived to 96    Hearing loss Mother         Hearing Aides    Hyperlipidemia Mother     Kidney disease Mother         Twisted plumbing on both kidneys.    Stroke Mother         several TIA's in her 80's    Diabetes Father         Type 2    Hypertension Father     Heart disease Father         Heart attacks and  of congestive heart failure    Arthritis Father         General arthritis knees    Hearing loss Father         Hearing Aides    Hyperlipidemia Father     Arthritis Brother         Neck and back issues    Hearing loss Brother         Hearing Aides    Hyperlipidemia Brother     Colon cancer Neg Hx      Social History     Socioeconomic History    Marital status: Single   Tobacco Use    Smoking status: Former     Types: Cigars     Quit date:      Years since quittin.8     Passive exposure: Never    Smokeless tobacco: Never   Vaping Use    Vaping status: Never Used   Substance and Sexual Activity    Alcohol use: Not Currently     Comment: Quit drinking alcohol in     Drug use: Never    Sexual activity: Not Currently     Partners: Female     Comment: n/a       Current Outpatient Medications:     ketoconazole (NIZORAL) 2 % cream, APPLY A THIN LAYER OF CREAM WITH A Q-TIP TOPICALLY TWICE DAILY TO THE EARS AS NEEDED FOR FLAKING, Disp: , Rfl:     losartan  (COZAAR) 25 MG tablet, Take 1 tablet by mouth Daily., Disp: 90 tablet, Rfl: 3    meloxicam (Mobic) 7.5 MG tablet, Take 1 tablet by mouth Daily., Disp: 90 tablet, Rfl: 2    methocarbamol (ROBAXIN) 500 MG tablet, Take 1 tablet by mouth 3 (Three) Times a Day As Needed for Muscle Spasms., Disp: 60 tablet, Rfl: 2    metoprolol succinate XL (TOPROL-XL) 25 MG 24 hr tablet, Take 1 tablet by mouth once daily, Disp: 90 tablet, Rfl: 0    simvastatin (ZOCOR) 40 MG tablet, Take 1 tablet by mouth every night at bedtime., Disp: 90 tablet, Rfl: 3    No Known Allergies    The following portions of the patient's history were reviewed and updated as appropriate: allergies, current medications, past family history, past medical history, past social history, past surgical history and problem list.  Objective     Physical Exam  Vitals and nursing note reviewed.   Constitutional:       General: He is not in acute distress.     Appearance: Normal appearance. He is well-developed.   HENT:      Head: Normocephalic and atraumatic.      Mouth/Throat:      Mouth: Mucous membranes are not pale, not dry and not cyanotic.   Eyes:      General: Lids are normal.   Neck:      Trachea: Trachea normal.   Cardiovascular:      Rate and Rhythm: Normal rate.   Pulmonary:      Effort: Pulmonary effort is normal. No respiratory distress.      Breath sounds: Normal breath sounds.   Abdominal:      Tenderness: There is no abdominal tenderness.   Skin:     General: Skin is warm and dry.   Neurological:      Mental Status: He is alert and oriented to person, place, and time.   Psychiatric:         Mood and Affect: Mood normal.         Speech: Speech normal.         Behavior: Behavior normal. Behavior is cooperative.       Vitals:    10/30/24 1549   BP: 104/60   Pulse: 85   SpO2: 98%   Weight: 103 kg (226 lb)     Body mass index is 32.43 kg/m².     Results Review:   I reviewed the patient's new clinical results.    Admission on 08/15/2024, Discharged on  08/15/2024   Component Date Value Ref Range Status    Reference Lab Report 08/15/2024    Final                    Value:[facility]  [address] [place] [address]  Phone: [phone] or [phone]  Fax: [phone]  Anders Bean M.D., Medical Director    PATIENT NAME                           LABORATORY NO.  MANDIE NATHAN JR                  [id number]  [id number]                         AGE              SEX  SSN           CLIENT REF #  [facility]            73      1950      [id number]   [id number]    [address]                REQUESTING M.D.     ATTENDING MROLAND     COPY TO.   [address]                        [place] [address]                 [facility]  DATE COLLECTED      DATE RECEIVED      DATE REPORTED  08/15/2024          08/15/2024         2024    DIAGNOSIS:  A.   TERMINAL ILEUM BIOPSY:  Unremarkable ileal mucosa.  Negative for acute inflammation, architectural distortion, dysplasia,  metaplasia, and granulomas.  B.   COLON, LEFT, RIGHT, AND TRANSVERSE,                           BIOPSIES:  Unremarkable colonic mucosa.  No evidence of microscopic colitis or IBD-type changes.  C.   ASCENDING COLON POLYP:  Tubular adenoma.  Negative for high grade dysplasia.  D.   HEPATIC FLEXURE POLYP:  Colonic mucosa without histologic features of a polyp.       REVIEWED, DIAGNOSED AND ELECTRONICALLY  SIGNED BY:    Salazar Blackmon M.D., F.C.A.P.  CPT CODES:  88305x4        TSH (10/26/2022 04:55)  Comprehensive Metabolic Panel (2024 14:51)  CBC & Differential (2024 14:51)  Protime-INR (2024 14:51)  COLOGUARD SCANNED (2024 10:15)    CT Angiogram Abdomen Pelvis     Result Date: 2024  Constipation.  Diverticulosis.  Question rectal wall thickening but no infiltration of the surrounding fat, proctitis in the differential.       Colonoscopy dated 2018 per Dr. Burt,  [facility]  - Mild diverticulosis.  - Small sessile polyp status post polypectomy with cold forceps.  - Sigmoid colon polyp biopsy with fragment of hyperplastic polyp, no malignancy.  - Colonoscopy in 10 years per the records.    Colonoscopy dated 8/15/2024 per Dr. Mehta  - Preparation of the colon was fair.  - Perianal skin tags found on perianal exam.  - One 3 mm polyp in the proximal ascending colon, removed with a cold snare. Resected and retrieved. Likely lymphoid aggregate vs hyperplastic  - One 2 mm polyp in the proximal transverse colon, removed with a cold biopsy forceps. Resected and retrieved.  - Diverticulosis in the sigmoid colon and in the descending colon.  - Non-bleeding external and internal hemorrhoids.  - The examined portion of the ileum was normal. Biopsied.  - Biopsies were taken with a cold forceps for histology in the descending colon, in the transverse colon and in the ascending colon for change in bowel habit.  - Likely had stercoral proctitis with constipation. No current proctitis  A.   TERMINAL ILEUM BIOPSY:  Unremarkable ileal mucosa.  Negative for acute inflammation, architectural distortion, dysplasia,  metaplasia, and granulomas.  B.   COLON, LEFT, RIGHT, AND TRANSVERSE, BIOPSIES:  Unremarkable colonic mucosa.  No evidence of microscopic colitis or IBD-type changes.  C.   ASCENDING COLON POLYP:  Tubular adenoma.  Negative for high grade dysplasia.  D.   HEPATIC FLEXURE POLYP:  Colonic mucosa without histologic features of a polyp.      Assessment / Plan      1. Constipation, unspecified constipation type  2. Change in bowel habits  3. Stercoral colitis  He is taking Metamucil daily and has not had any further for sowed's of constipation.  Denies any GI bleeding.  CTAP dated 5/23/2024 with questionable rectal wall thickening. Colonoscopy dated 8/15/2024 with polyp removed, otherwise unremarkable.  Terminal ileum and random biopsies unremarkable. Likely  had stercoral proctitis with constipation.  No current proctitis per colonoscopy.  High-fiber, low-fat diet with liberal water intake.  Continue Metamucil or fiber Gummies/capsules daily.  May take Miralax or stool softeners if needed.    4. Adenomatous polyp of ascending colon  Colonoscopy dated 8/15/2024 with polyps removed, one tubular adenoma without dysplasia, 1 with colonic mucosa without histologic features of a polyp.  No family history of colon cancer.  Colonoscopy for surveillance in 7 years, August 2031, per current ACG guidelines, or sooner if needed.    Patient Instructions   High fiber, low fat diet with liberal water intake.   Metamucil 1 packet/scoop daily or fiber gummies/capsules 2-4 per day.   Low FODMAP diet - avoid all dairy. May use lactose free/dairy free alternatives such as almond milk, rice milk, oat milk, etc.   Miralax 17 grams daily for constipation as needed.  May add stool softeners 2 per day as needed for constipation.   Colonoscopy for surveillance in 7 years, August 2031, per current ACG guidelines.   Follow up: 7 year or sooner if needed        Low-FODMAP Eating Plan    FODMAP stands for fermentable oligosaccharides, disaccharides, monosaccharides, and polyols. These are sugars that are hard for some people to digest. A low-FODMAP eating plan may help some people who have irritable bowel syndrome (IBS) and certain other bowel (intestinal) diseases to manage their symptoms.  This meal plan can be complicated to follow. Work with a diet and nutrition specialist (dietitian) to make a low-FODMAP eating plan that is right for you. A dietitian can help make sure that you get enough nutrition from this diet.  What are tips for following this plan?  Reading food labels  Check labels for hidden FODMAPs such as:  High-fructose syrup.  Honey.  Agave.  Natural fruit flavors.  Onion or garlic powder.  Choose low-FODMAP foods that contain 3-4 grams of fiber per serving.  Check food labels for  serving sizes. Eat only one serving at a time to make sure FODMAP levels stay low.  Shopping  Shop with a list of foods that are recommended on this diet and make a meal plan.  Meal planning  Follow a low-FODMAP eating plan for up to 6 weeks, or as told by your health care provider or dietitian.  To follow the eating plan:  Eliminate high-FODMAP foods from your diet completely. Choose only low-FODMAP foods to eat. You will do this for 2-6 weeks.  Gradually reintroduce high-FODMAP foods into your diet one at a time. Most people should wait a few days before introducing the next new high-FODMAP food into their meal plan. Your dietitian can recommend how quickly you may reintroduce foods.  Keep a daily record of what and how much you eat and drink. Make note of any symptoms that you have after eating.  Review your daily record with a dietitian regularly to identify which foods you can eat and which foods you should avoid.  General tips  Drink enough fluid each day to keep your urine pale yellow.  Avoid processed foods. These often have added sugar and may be high in FODMAPs.  Avoid most dairy products, whole grains, and sweeteners.  Work with a dietitian to make sure you get enough fiber in your diet.  Avoid high FODMAP foods at meals to manage symptoms.     Recommended foods  Fruits  Bananas, oranges, tangerines, genet, limes, blueberries, raspberries, strawberries, grapes, cantaloupe, honeydew melon, kiwi, papaya, passion fruit, and pineapple. Limited amounts of dried cranberries, banana chips, and shredded coconut.  Vegetables  Eggplant, zucchini, cucumber, peppers, green beans, bean sprouts, lettuce, arugula, kale, Swiss chard, spinach, sue greens, bok haris, summer squash, potato, and tomato. Limited amounts of corn, carrot, and sweet potato. Green parts of scallions.  Grains  Gluten-free grains, such as rice, oats, buckwheat, quinoa, corn, polenta, and millet. Gluten-free pasta, bread, or cereal. Rice  "noodles. Corn tortillas.  Meats and other proteins  Unseasoned beef, pork, poultry, or fish. Eggs. Castañeda. Tofu (firm) and tempeh. Limited amounts of nuts and seeds, such as almonds, walnuts, brazil nuts, pecans, peanuts, nut butters, pumpkin seeds, tasha seeds, and sunflower seeds.  Dairy  Lactose-free milk, yogurt, and kefir. Lactose-free cottage cheese and ice cream. Non-dairy milks, such as almond, coconut, hemp, and rice milk. Non-dairy yogurt. Limited amounts of goat cheese, brie, mozzarella, parmesan, swiss, and other hard cheeses.  Fats and oils  Butter-free spreads. Vegetable oils, such as olive, canola, and sunflower oil.  Seasoning and other foods  Artificial sweeteners with names that do not end in \"ol,\" such as aspartame, saccharine, and stevia. Maple syrup, white table sugar, raw sugar, brown sugar, and molasses. Mayonnaise, soy sauce, and tamari. Fresh basil, coriander, parsley, rosemary, and thyme.  Beverages  Water and mineral water. Sugar-sweetened soft drinks. Small amounts of orange juice or cranberry juice. Black and green tea. Most dry mee. Coffee.  The items listed above may not be a complete list of foods and beverages you can eat. Contact a dietitian for more information.     Foods to avoid  Fruits  Fresh, dried, and juiced forms of apple, pear, watermelon, peach, plum, cherries, apricots, blackberries, boysenberries, figs, nectarines, and twan. Avocado.  Vegetables  Chicory root, artichoke, asparagus, cabbage, snow peas, [place] sprouts, broccoli, sugar snap peas, mushrooms, celery, and cauliflower. Onions, garlic, leeks, and the white part of scallions.  Grains  Wheat, including kamut, durum, and semolina. Barley and bulgur. Couscous. Wheat-based cereals. Wheat noodles, bread, crackers, and pastries.  Meats and other proteins  Fried or fatty meat. Sausage. Cashews and pistachios. Soybeans, baked beans, black beans, chickpeas, kidney beans, carrington beans, navy beans, lentils, black-eyed " peas, and split peas.  Dairy  Milk, yogurt, ice cream, and soft cheese. Cream and sour cream. Milk-based sauces. Custard. Buttermilk. Soy milk.  Seasoning and other foods  Any sugar-free gum or candy. Foods that contain artificial sweeteners such as sorbitol, mannitol, isomalt, or xylitol. Foods that contain honey, high-fructose corn syrup, or agave. Bouillon, vegetable stock, beef stock, and chicken stock. Garlic and onion powder. Condiments made with onion, such as hummus, chutney, pickles, relish, salad dressing, and salsa. Tomato paste.  Beverages  Chicory-based drinks. Coffee substitutes. Chamomile tea. Fennel tea. Sweet or fortified mee such as port or anil. Diet soft drinks made with isomalt, mannitol, maltitol, sorbitol, or xylitol. Apple, pear, and twan juice. Juices with high-fructose corn syrup.  The items listed above may not be a complete list of foods and beverages you should avoid. Contact a dietitian for more information.     Summary  FODMAP stands for fermentable oligosaccharides, disaccharides, monosaccharides, and polyols. These are sugars that are hard for some people to digest.  A low-FODMAP eating plan is a short-term diet that helps to ease symptoms of certain bowel diseases.  The eating plan usually lasts up to 6 weeks. After that, high-FODMAP foods are reintroduced gradually and one at a time. This can help you find out which foods may be causing symptoms.  A low-FODMAP eating plan can be complicated. It is best to work with a dietitian who has experience with this type of plan.  This information is not intended to replace advice given to you by your health care provider. Make sure you discuss any questions you have with your health care provider.  Document Revised: 05/06/2021 Document Reviewed: 05/06/2021  [employer] Patient Education © 2023 [employer]                Jaclyn Troncoso, LYNSEY  10/30/2024    Please note that portions of this note were completed with a voice recognition  program.     Patient or patient representative verbalized consent for the use of Ambient Listening during the visit with  LYNSEY Montes De Oca for chart documentation. 10/30/2024  16:05 EDT

## 2024-11-13 DIAGNOSIS — I10 ESSENTIAL HYPERTENSION: Chronic | ICD-10-CM

## 2024-11-13 RX ORDER — METOPROLOL SUCCINATE 25 MG/1
25 TABLET, EXTENDED RELEASE ORAL DAILY
Qty: 90 TABLET | Refills: 0 | Status: SHIPPED | OUTPATIENT
Start: 2024-11-13

## 2025-01-14 ENCOUNTER — OFFICE VISIT (OUTPATIENT)
Age: 75
End: 2025-01-14
Payer: MEDICARE

## 2025-01-14 VITALS
SYSTOLIC BLOOD PRESSURE: 130 MMHG | HEIGHT: 70 IN | BODY MASS INDEX: 30.21 KG/M2 | HEART RATE: 51 BPM | DIASTOLIC BLOOD PRESSURE: 70 MMHG | OXYGEN SATURATION: 98 % | WEIGHT: 211 LBS

## 2025-01-14 DIAGNOSIS — N13.8 BPH WITH OBSTRUCTION/LOWER URINARY TRACT SYMPTOMS: Chronic | ICD-10-CM

## 2025-01-14 DIAGNOSIS — I47.10 PAROXYSMAL SUPRAVENTRICULAR TACHYCARDIA: ICD-10-CM

## 2025-01-14 DIAGNOSIS — L71.9 ROSACEA, ACNE: ICD-10-CM

## 2025-01-14 DIAGNOSIS — E78.5 DYSLIPIDEMIA: Chronic | ICD-10-CM

## 2025-01-14 DIAGNOSIS — K21.9 GERD WITHOUT ESOPHAGITIS: Chronic | ICD-10-CM

## 2025-01-14 DIAGNOSIS — R73.01 IMPAIRED FASTING GLUCOSE: ICD-10-CM

## 2025-01-14 DIAGNOSIS — M54.16 CHRONIC LUMBAR RADICULOPATHY: Primary | ICD-10-CM

## 2025-01-14 DIAGNOSIS — N40.1 BPH WITH OBSTRUCTION/LOWER URINARY TRACT SYMPTOMS: Chronic | ICD-10-CM

## 2025-01-14 DIAGNOSIS — I10 ESSENTIAL HYPERTENSION: Chronic | ICD-10-CM

## 2025-01-14 LAB
EXPIRATION DATE: NORMAL
HBA1C MFR BLD: 5.6 % (ref 4.5–5.7)
Lab: NORMAL

## 2025-01-14 PROCEDURE — 3044F HG A1C LEVEL LT 7.0%: CPT | Performed by: FAMILY MEDICINE

## 2025-01-14 PROCEDURE — 3075F SYST BP GE 130 - 139MM HG: CPT | Performed by: FAMILY MEDICINE

## 2025-01-14 PROCEDURE — 1126F AMNT PAIN NOTED NONE PRSNT: CPT | Performed by: FAMILY MEDICINE

## 2025-01-14 PROCEDURE — 3078F DIAST BP <80 MM HG: CPT | Performed by: FAMILY MEDICINE

## 2025-01-14 PROCEDURE — 83036 HEMOGLOBIN GLYCOSYLATED A1C: CPT | Performed by: FAMILY MEDICINE

## 2025-01-14 PROCEDURE — 99214 OFFICE O/P EST MOD 30 MIN: CPT | Performed by: FAMILY MEDICINE

## 2025-01-14 NOTE — PROGRESS NOTES
Established Patient        Chief Complaint:   Chief Complaint   Patient presents with    Back Pain     Discuss back pain and medicine         Adolfo Suazo Jr. is a 74 y.o. male    History of Present Illness:   Answers submitted by the patient for this visit:  Primary Reason for Visit (Submitted on 1/7/2025)  What is the primary reason for your visit?: Problem Not Listed  Problem not listed (Submitted on 1/7/2025)  Chief Complaint: Other medical problem  abdominal pain: No  anorexia: No  joint pain: No  change in stool: No  chest pain: No  chills: No  nasal congestion: No  cough: No  diaphoresis: No  fatigue: No  fever: No  headaches: No  joint swelling: No  myalgias: No  nausea: No  neck pain: No  numbness: No  rash: No  sore throat: No  swollen glands: No  dysuria: No  vertigo: No  visual change: No  vomiting: No  weakness: No  Other symptom: Annual exam  Medications tried: n/a  Additional information: n/a    History of Present Illness  The patient is a 74-year-old male who presents for evaluation of back pain, sciatica, rosacea, and diabetes.    He has been experiencing back discomfort, which he attributes to arthritis as confirmed by radiographic evidence. He has been managing this condition with methocarbamol on an as-needed basis and meloxicam daily.    He also reports a persistent issue with sciatica, which is not severe and is being effectively managed with medication. He has sought additional relief from an acupuncturist, which he finds beneficial.    He has been diagnosed with acne rosacea, for which he underwent surgical intervention on his nose. He has observed a change in the texture of his skin, accompanied by a pinkish hue. He expresses uncertainty about the continued use of his prescribed medication.    His A1c was 6.9 at one point. He admits to dietary indiscretions around [place], which he believes may have contributed to the elevated A1c levels.    Supplemental  "Information  He got hit by norovirus right after [place].    MEDICATIONS  methocarbamol, meloxicam         Subjective     The following portions of the patient's history were reviewed and updated as appropriate: allergies, current medications, past family history, past medical history, past social history, past surgical history and problem list.    No Known Allergies    Review of Systems:    Constitutional: Negative for fever. Negative for chills, diaphoresis, fatigue and unexpected weight change.   HENT: No dysphagia; no changes to vision/hearing/smell/taste; no epistaxis  Eyes: Negative for redness and visual disturbance.   Respiratory: negative for shortness of breath. Negative for chest pain . Negative for cough and chest tightness.   Cardiovascular: Negative for chest pain and palpitations.   Gastrointestinal: Negative for abdominal distention, abdominal pain and blood in stool.   Endocrine: Negative for cold intolerance and heat intolerance.   Genitourinary: Negative for difficulty urinating, dysuria and frequency.   Musculoskeletal: As per above.  Skin: Negative for color change, rash and wound.   Neurological: Negative for syncope, weakness and headaches.   Hematological: Negative for adenopathy. Does not bruise/bleed easily.   Psychiatric/Behavioral: Negative for confusion. The patient is not nervous/anxious.    Objective     Physical Exam   Vital Signs: /70   Pulse 51   Ht 177.8 cm (70\")   Wt 95.7 kg (211 lb)   SpO2 98%   BMI 30.28 kg/m²         General Appearance: alert, oriented x 3, no acute distress.  Skin: warm and dry.   HEENT: Atraumatic.  pupils round and reactive to light and accommodation, oral mucosa pink and moist.  Nares patent without epistaxis.  External auditory canals are patent tympanic membranes intact.  Neck: supple, no JVD, trachea midline.  No thyromegaly  Lungs: CTA, unlabored breathing effort.  Heart: RRR, normal S1 and S2, no S3, no rub.  Abdomen: soft, non-tender, no " palpable bladder, present bowel sounds to auscultation ×4.  No guarding or rigidity.  Extremities: no clubbing, cyanosis or edema.  Good range of motion actively and passively.  Symmetric muscle strength and development.  No leg length discrepancies.  Ambulates independently, however slightly antalgic.  Quadriceps mechanism intact, normal dorsiflexion/plantarflexion of the feet.  Logroll negative.  There is some paravertebral muscular spasticity noted to the left lumbar spine.  Straight leg raise weakly positive sitting/supine.  Neuro: normal speech and mental status.  Cranial nerves II through XII intact.  No anosmia. DTR 2+; proprioception intact.  No focal motor/sensory deficits.      Assessment and Plan      Assessment/Plan:   Diagnoses and all orders for this visit:    1. Chronic lumbar radiculopathy (Primary)    2. BPH with obstruction/lower urinary tract symptoms    3. Essential hypertension    4. Paroxysmal supraventricular tachycardia    5. GERD without esophagitis    6. Dyslipidemia    7. Impaired fasting glucose  -     POC Glycosylated Hemoglobin (Hb A1C)    8. Rosacea, acne      Continue compounded treatment provided by dermatology    HbA1c now normalized.    Continue prn use methocarbamol.    Discussion Summary:    Discussed plan of care in detail with pt today; pt verb understanding and agrees.      Follow up:  No follow-ups on file.     There are no Patient Instructions on file for this visit.            1/14/2025     I spent 35 minutes caring for Adolfo on this date of service. This time includes time spent by me in the following activities:preparing for the visit, performing a medically appropriate examination and/or evaluation , counseling and educating the patient/family/caregiver, ordering medications, tests, or procedures, documenting information in the medical record, independently interpreting results and communicating that information with the patient/family/caregiver, and care  coordination    I confirm accuracy of unchanged data/findings which have been carried forward from previous visit, as well as I have updated appropriately those that have changed.    Patient or patient representative verbalized consent for the use of Ambient Listening during the visit with  Sachin Edwards DO for chart documentation. 1/14/2025  09:20 EST      Sachin Edwards DO  01/14/25  09:25 EST

## 2025-02-03 ENCOUNTER — PROCEDURE VISIT (OUTPATIENT)
Dept: UROLOGY | Facility: CLINIC | Age: 75
End: 2025-02-03
Payer: MEDICARE

## 2025-02-03 DIAGNOSIS — Z85.51 HISTORY OF BLADDER CANCER: Primary | ICD-10-CM

## 2025-02-03 PROCEDURE — 52000 CYSTOURETHROSCOPY: CPT | Performed by: UROLOGY

## 2025-02-03 NOTE — PROGRESS NOTES
Preprocedure diagnosis  history of low-grade noninvasive urothelial carcinoma, last TURBT 2/25/2021.  No current bothersome lower urinary tract symptoms and no recent gross hematuria.    Postprocedure diagnosis  No tumors identified    Procedure  Flexible Cystourethroscopy    Attending surgeon  Trey Lam MD    Anesthesia  2% lidocaine jelly intraurethrally    Complications  None    Indications  74 y.o. male undergoing a flexible cystoscopy for the above mentioned indications.  Informed consent was obtained.      Findings  Cystoscopic findings included one right and left ureteral orifice in the normal anatomic position with normal bladder mucosa and no tumors, masses or stones. The urethral urothelium was within normal limits with no strictures.  There was not a prominent median lobe.  The lateral lobes were still obstructive in appearance.  There was evidence of prostatic intravesical growth.    Procedure  The patient was placed in supine position and prepped and draped in sterile fashion with lidocaine jelly per urethra for anesthesia.  A timeout was performed.  The 14F flexible cystoscope was lubricated and gently placed through the penile urethra and into the bladder.  The bladder was completely visualized.  The cystoscope was retroflexed and the bladder neck and prostate visualized.  The cystoscope was slowly withdrawn while visualizing the urethra and the procedure terminated.  The patient tolerated the procedure well.

## 2025-02-06 DIAGNOSIS — I10 ESSENTIAL HYPERTENSION: Chronic | ICD-10-CM

## 2025-02-07 RX ORDER — METOPROLOL SUCCINATE 25 MG/1
25 TABLET, EXTENDED RELEASE ORAL DAILY
Qty: 90 TABLET | Refills: 3 | Status: SHIPPED | OUTPATIENT
Start: 2025-02-07

## 2025-02-07 NOTE — TELEPHONE ENCOUNTER
Rx Refill Note  Requested Prescriptions     Pending Prescriptions Disp Refills    metoprolol succinate XL (TOPROL-XL) 25 MG 24 hr tablet [Pharmacy Med Name: Metoprolol Succinate ER 25 MG Oral Tablet Extended Release 24 Hour] 90 tablet 0     Sig: Take 1 tablet by mouth once daily      Last office visit with prescribing clinician: 1/14/2025   Last telemedicine visit with prescribing clinician: Visit date not found   Next office visit with prescribing clinician: 5/19/2025     Katarina Dukes MA  02/07/25, 11:56 EST

## 2025-05-19 ENCOUNTER — OFFICE VISIT (OUTPATIENT)
Age: 75
End: 2025-05-19
Payer: MEDICARE

## 2025-05-19 VITALS
HEIGHT: 70 IN | SYSTOLIC BLOOD PRESSURE: 126 MMHG | DIASTOLIC BLOOD PRESSURE: 62 MMHG | WEIGHT: 218 LBS | BODY MASS INDEX: 31.21 KG/M2 | OXYGEN SATURATION: 97 % | HEART RATE: 72 BPM

## 2025-05-19 DIAGNOSIS — M54.16 CHRONIC LUMBAR RADICULOPATHY: ICD-10-CM

## 2025-05-19 DIAGNOSIS — Z00.00 MEDICARE ANNUAL WELLNESS VISIT, SUBSEQUENT: Primary | ICD-10-CM

## 2025-05-19 DIAGNOSIS — E78.5 DYSLIPIDEMIA: Chronic | ICD-10-CM

## 2025-05-19 DIAGNOSIS — I47.10 PAROXYSMAL SUPRAVENTRICULAR TACHYCARDIA: ICD-10-CM

## 2025-05-19 DIAGNOSIS — Z12.5 PROSTATE CANCER SCREENING: ICD-10-CM

## 2025-05-19 DIAGNOSIS — I10 ESSENTIAL HYPERTENSION: ICD-10-CM

## 2025-05-19 DIAGNOSIS — M54.16 CHRONIC LEFT-SIDED LUMBAR RADICULOPATHY: ICD-10-CM

## 2025-05-19 LAB
ALBUMIN SERPL-MCNC: 4.2 G/DL (ref 3.5–5.2)
ALBUMIN/GLOB SERPL: 2.5 G/DL
ALP SERPL-CCNC: 82 U/L (ref 39–117)
ALT SERPL W P-5'-P-CCNC: 32 U/L (ref 1–41)
ANION GAP SERPL CALCULATED.3IONS-SCNC: 10 MMOL/L (ref 5–15)
AST SERPL-CCNC: 34 U/L (ref 1–40)
BASOPHILS # BLD AUTO: 0.07 10*3/MM3 (ref 0–0.2)
BASOPHILS NFR BLD AUTO: 0.8 % (ref 0–1.5)
BILIRUB SERPL-MCNC: 1.2 MG/DL (ref 0–1.2)
BUN SERPL-MCNC: 12 MG/DL (ref 8–23)
BUN/CREAT SERPL: 13.2 (ref 7–25)
CALCIUM SPEC-SCNC: 9.4 MG/DL (ref 8.6–10.5)
CHLORIDE SERPL-SCNC: 106 MMOL/L (ref 98–107)
CHOLEST SERPL-MCNC: 133 MG/DL (ref 0–200)
CO2 SERPL-SCNC: 23 MMOL/L (ref 22–29)
CREAT SERPL-MCNC: 0.91 MG/DL (ref 0.76–1.27)
DEPRECATED RDW RBC AUTO: 42.8 FL (ref 37–54)
EGFRCR SERPLBLD CKD-EPI 2021: 87.9 ML/MIN/1.73
EOSINOPHIL # BLD AUTO: 0.2 10*3/MM3 (ref 0–0.4)
EOSINOPHIL NFR BLD AUTO: 2.3 % (ref 0.3–6.2)
ERYTHROCYTE [DISTWIDTH] IN BLOOD BY AUTOMATED COUNT: 12.3 % (ref 12.3–15.4)
GLOBULIN UR ELPH-MCNC: 1.7 GM/DL
GLUCOSE SERPL-MCNC: 91 MG/DL (ref 65–99)
HCT VFR BLD AUTO: 47.8 % (ref 37.5–51)
HDLC SERPL-MCNC: 51 MG/DL (ref 40–60)
HGB BLD-MCNC: 16.2 G/DL (ref 13–17.7)
IMM GRANULOCYTES # BLD AUTO: 0.03 10*3/MM3 (ref 0–0.05)
IMM GRANULOCYTES NFR BLD AUTO: 0.4 % (ref 0–0.5)
LDLC SERPL CALC-MCNC: 70 MG/DL (ref 0–100)
LDLC/HDLC SERPL: 1.38 {RATIO}
LYMPHOCYTES # BLD AUTO: 2.52 10*3/MM3 (ref 0.7–3.1)
LYMPHOCYTES NFR BLD AUTO: 29.5 % (ref 19.6–45.3)
MCH RBC QN AUTO: 32.1 PG (ref 26.6–33)
MCHC RBC AUTO-ENTMCNC: 33.9 G/DL (ref 31.5–35.7)
MCV RBC AUTO: 94.8 FL (ref 79–97)
MONOCYTES # BLD AUTO: 0.6 10*3/MM3 (ref 0.1–0.9)
MONOCYTES NFR BLD AUTO: 7 % (ref 5–12)
NEUTROPHILS NFR BLD AUTO: 5.12 10*3/MM3 (ref 1.7–7)
NEUTROPHILS NFR BLD AUTO: 60 % (ref 42.7–76)
NRBC BLD AUTO-RTO: 0 /100 WBC (ref 0–0.2)
PLATELET # BLD AUTO: 172 10*3/MM3 (ref 140–450)
PMV BLD AUTO: 12.2 FL (ref 6–12)
POTASSIUM SERPL-SCNC: 4.4 MMOL/L (ref 3.5–5.2)
PROT SERPL-MCNC: 5.9 G/DL (ref 6–8.5)
PSA SERPL-MCNC: 3.85 NG/ML (ref 0–4)
RBC # BLD AUTO: 5.04 10*6/MM3 (ref 4.14–5.8)
SODIUM SERPL-SCNC: 139 MMOL/L (ref 136–145)
TRIGL SERPL-MCNC: 57 MG/DL (ref 0–150)
VLDLC SERPL-MCNC: 12 MG/DL (ref 5–40)
WBC NRBC COR # BLD AUTO: 8.54 10*3/MM3 (ref 3.4–10.8)

## 2025-05-19 PROCEDURE — 80053 COMPREHEN METABOLIC PANEL: CPT | Performed by: FAMILY MEDICINE

## 2025-05-19 PROCEDURE — 80061 LIPID PANEL: CPT | Performed by: FAMILY MEDICINE

## 2025-05-19 PROCEDURE — G0103 PSA SCREENING: HCPCS | Performed by: FAMILY MEDICINE

## 2025-05-19 PROCEDURE — 85025 COMPLETE CBC W/AUTO DIFF WBC: CPT | Performed by: FAMILY MEDICINE

## 2025-05-19 RX ORDER — METHOCARBAMOL 500 MG/1
500 TABLET, FILM COATED ORAL 3 TIMES DAILY PRN
Qty: 180 TABLET | Refills: 3 | Status: SHIPPED | OUTPATIENT
Start: 2025-05-19

## 2025-05-19 RX ORDER — MELOXICAM 7.5 MG/1
7.5 TABLET ORAL DAILY
Qty: 90 TABLET | Refills: 3 | Status: SHIPPED | OUTPATIENT
Start: 2025-05-19

## 2025-05-19 NOTE — PROGRESS NOTES
Subjective   The ABCs of the Annual Wellness Visit  Medicare Wellness Visit      Adolfo Suazo Jr. is a 74 y.o. patient who presents for a Medicare Wellness Visit.    Back pain well controlled with current exercise/medication regimen.        The following portions of the patient's history were reviewed and   updated as appropriate: allergies, current medications, past family history, past medical history, past social history, past surgical history, and problem list.    Compared to one year ago, the patient's physical   health is the same.  Compared to one year ago, the patient's mental   health is the same.    Recent Hospitalizations:  He was not admitted to the hospital during the last year.     Current Medical Providers:  Patient Care Team:  Sachin Edwards DO as PCP - General (Family Medicine)  Jaclyn Troncoso APRN as Nurse Practitioner (Gastroenterology)    Outpatient Medications Prior to Visit   Medication Sig Dispense Refill    ketoconazole (NIZORAL) 2 % cream APPLY A THIN LAYER OF CREAM WITH A Q-TIP TOPICALLY TWICE DAILY TO THE EARS AS NEEDED FOR FLAKING      losartan (COZAAR) 25 MG tablet Take 1 tablet by mouth Daily. 90 tablet 3    metoprolol succinate XL (TOPROL-XL) 25 MG 24 hr tablet Take 1 tablet by mouth once daily 90 tablet 3    simvastatin (ZOCOR) 40 MG tablet Take 1 tablet by mouth every night at bedtime. 90 tablet 3    meloxicam (Mobic) 7.5 MG tablet Take 1 tablet by mouth Daily. 90 tablet 2    methocarbamol (ROBAXIN) 500 MG tablet Take 1 tablet by mouth 3 (Three) Times a Day As Needed for Muscle Spasms. 60 tablet 2     No facility-administered medications prior to visit.     No opioid medication identified on active medication list. I have reviewed chart for other potential  high risk medication/s and harmful drug interactions in the elderly.      Aspirin is not on active medication list.  Aspirin use is not indicated based on review of current medical condition/s. Risk of harm outweighs  potential benefits.  .    Patient Active Problem List   Diagnosis    Essential hypertension    Dyslipidemia    History of bladder carcinoma    Impingement syndrome of right shoulder    Pain in left knee    Anxiety about health    Atypical chest pain    BPH with obstruction/lower urinary tract symptoms    Chronic anxiety    Cigar smoker    Disorder of bone    Extensor tenosynovitis of wrist    GERD without esophagitis    Hardening of the aorta (main artery of the heart)    History of syncope    Labile blood pressure    Left ventricular hypertrophy    Obesity with body mass index 30 or greater    Obesity    Paroxysmal supraventricular tachycardia    Primary urothelial carcinoma of overlapping sites of urinary organs    Pruritic disorder    Solar lentigo    Spasm of thoracic back muscle    Syncope    Tinnitus of right ear    Chronic lumbar radiculopathy    Constipation    Change in bowel habits    Abnormal CT scan, colon    Encounter for screening for malignant neoplasm of colon     Advance Care Planning Advance Directive is on file.  ACP discussion was held with the patient during this visit. Patient has an advance directive in EMR which is still valid.       Review of Systems:     Constitutional: Negative for fever. Negative for chills, diaphoresis, fatigue and unexpected weight change.   HENT: No dysphagia; no changes to vision/hearing/smell/taste; no epistaxis  Eyes: Negative for redness and visual disturbance.   Respiratory: negative for shortness of breath. Negative for chest pain . Negative for cough and chest tightness.   Cardiovascular: Negative for chest pain and palpitations.   Gastrointestinal: Negative for abdominal distention, abdominal pain and blood in stool.   Endocrine: Negative for cold intolerance and heat intolerance.   Genitourinary: Negative for difficulty urinating, dysuria and frequency.   Musculoskeletal: As per above.  Skin: Negative for color change, rash and wound.   Neurological: Negative  "for syncope, weakness and headaches.   Hematological: Negative for adenopathy. Does not bruise/bleed easily.   Psychiatric/Behavioral: Negative for confusion. The patient is not nervous/anxious.      Objective   Vitals:    05/19/25 0914   BP: 126/62   Pulse: (!) 49   SpO2: 97%   Weight: 98.9 kg (218 lb)   Height: 177.8 cm (70\")   PainSc: 0-No pain     Physical Exam   Vital Signs: /70   Pulse 51   Ht 177.8 cm (70\")   Wt 95.7 kg (211 lb)   SpO2 98%   BMI 30.28 kg/m²          General Appearance: alert, oriented x 3, no acute distress.  Skin: warm and dry.   HEENT: Atraumatic.  pupils round and reactive to light and accommodation, oral mucosa pink and moist.  Nares patent without epistaxis.  External auditory canals are patent tympanic membranes intact.  Neck: supple, no JVD, trachea midline.  No thyromegaly  Lungs: CTA, unlabored breathing effort.  Heart: RRR, normal S1 and S2, no S3, no rub.  Abdomen: soft, non-tender, no palpable bladder, present bowel sounds to auscultation ×4.  No guarding or rigidity.  Extremities: no clubbing, cyanosis or edema.  Good range of motion actively and passively.  Symmetric muscle strength and development.  No leg length discrepancies.  Ambulates independently, however slightly antalgic.  Quadriceps mechanism intact, normal dorsiflexion/plantarflexion of the feet.  Logroll negative.  There is some paravertebral muscular spasticity noted to the left lumbar spine.  Straight leg raise weakly positive sitting/supine.  Neuro: normal speech and mental status.  Cranial nerves II through XII intact.  No anosmia. DTR 2+; proprioception intact.  No focal motor/sensory deficits.    Estimated body mass index is 31.28 kg/m² as calculated from the following:    Height as of this encounter: 177.8 cm (70\").    Weight as of this encounter: 98.9 kg (218 lb).                Does the patient have evidence of cognitive impairment? No                                                                "                                Health  Risk Assessment    Smoking Status:  Social History     Tobacco Use   Smoking Status Former    Current packs/day: 0.00    Average packs/day: 0.3 packs/day for 6.0 years (1.5 ttl pk-yrs)    Types: Cigarettes, Cigars    Start date: 1966    Quit date:     Years since quitting: 3.3    Passive exposure: Never   Smokeless Tobacco Never     Alcohol Consumption:  Social History     Substance and Sexual Activity   Alcohol Use Not Currently    Comment: Quit drinking alcohol in        Fall Risk Screen  STEADI Fall Risk Assessment was completed, and patient is at LOW risk for falls.Assessment completed on:2025    Depression Screening   Little interest or pleasure in doing things? Not at all   Feeling down, depressed, or hopeless? Not at all   PHQ-2 Total Score 0      Health Habits and Functional and Cognitive Screenin/15/2025     7:37 AM   Functional & Cognitive Status   Do you have difficulty preparing food and eating? No   Do you have difficulty bathing yourself, getting dressed or grooming yourself? No   Do you have difficulty using the toilet? No   Do you have difficulty moving around from place to place? No   Do you have trouble with steps or getting out of a bed or a chair? No   Current Diet Well Balanced Diet   Dental Exam Up to date   Eye Exam Up to date   Exercise (times per week) 3 times per week   Current Exercises Include Aerobics;Walking   Do you need help using the phone?  No   Are you deaf or do you have serious difficulty hearing?  No   Do you need help to go to places out of walking distance? No   Do you need help shopping? No   Do you need help preparing meals?  No   Do you need help with housework?  No   Do you need help with laundry? No   Do you need help taking your medications? No   Do you need help managing money? No   Do you ever drive or ride in a car without wearing a seat belt? No   Have you felt unusual stress, anger or loneliness in  the last month? No   Who do you live with? Alone   If you need help, do you have trouble finding someone available to you? No   Have you been bothered in the last four weeks by sexual problems? No   Do you have difficulty concentrating, remembering or making decisions? No           Age-appropriate Screening Schedule:  Refer to the list below for future screening recommendations based on patient's age, sex and/or medical conditions. Orders for these recommended tests are listed in the plan section. The patient has been provided with a written plan.    Health Maintenance List  Health Maintenance   Topic Date Due    HEPATITIS C SCREENING  Never done    COVID-19 Vaccine (5 - 2024-25 season) 09/01/2024    RSV Vaccine - Adults (1 - 1-dose 75+ series) Never done    ANNUAL WELLNESS VISIT  05/10/2025    INFLUENZA VACCINE  07/01/2025    TDAP/TD VACCINES (2 - Td or Tdap) 04/20/2028    COLORECTAL CANCER SCREENING  08/15/2031    Pneumococcal Vaccine 50+  Completed    AAA SCREEN ONCE  Completed    ZOSTER VACCINE  Completed                                                                                                                                                CMS Preventative Services Quick Reference  Risk Factors Identified During Encounter  None Identified    The above risks/problems have been discussed with the patient.  Pertinent information has been shared with the patient in the After Visit Summary.  An After Visit Summary and PPPS were made available to the patient.    Follow Up:  Next Medicare Wellness visit to be scheduled in 1 year.     Assessment & Plan  Medicare annual wellness visit, subsequent    Orders:    Comprehensive metabolic panel    CBC w AUTO Differential    Lipid panel  I have discussed age/gender specific preventative healthcare issues in detail with patient today.  I have answered all of the questions.  Essential hypertension  Hypertension is stable and controlled  Continue current treatment  regimen.  Blood pressure will be reassessed in 6 months.    Orders:    Comprehensive metabolic panel    CBC w AUTO Differential  Vital signs demonstrate hemodynamic stability, blood pressure is at goal.  Dyslipidemia    Orders:    Comprehensive metabolic panel    Lipid panel    Paroxysmal supraventricular tachycardia    Orders:    Comprehensive metabolic panel    CBC w AUTO Differential  Vital signs demonstrate hemodynamic stability, heart rate and blood pressure are at goal.  Chronic lumbar radiculopathy         Prostate cancer screening    Orders:    PSA Screen    Chronic left-sided lumbar radiculopathy    Orders:    meloxicam (Mobic) 7.5 MG tablet; Take 1 tablet by mouth Daily.    methocarbamol (ROBAXIN) 500 MG tablet; Take 1 tablet by mouth 3 (Three) Times a Day As Needed for Muscle Spasms.  Continue core strengthening/stabilization exercise program.       Follow Up:  Return in about 6 months (around 11/19/2025) for Recheck.        I confirm accuracy of unchanged data/findings which have been carried forward from previous visit, as well as I have updated appropriately those that have changed.

## 2025-05-19 NOTE — ASSESSMENT & PLAN NOTE
Hypertension is stable and controlled  Continue current treatment regimen.  Blood pressure will be reassessed in 6 months.    Orders:    Comprehensive metabolic panel    CBC w AUTO Differential  Vital signs demonstrate hemodynamic stability, blood pressure is at goal.

## 2025-05-19 NOTE — ASSESSMENT & PLAN NOTE
Orders:    Comprehensive metabolic panel    CBC w AUTO Differential  Vital signs demonstrate hemodynamic stability, heart rate and blood pressure are at goal.

## 2025-05-19 NOTE — PATIENT INSTRUCTIONS
Advance Care Planning and Advance Directives     You make decisions on a daily basis - decisions about where you want to live, your career, your home, your life. Perhaps one of the most important decisions you face is your choice for future medical care. Take time to talk with your family and your healthcare team and start planning today.  Advance Care Planning is a process that can help you:  Understand possible future healthcare decisions in light of your own experiences  Reflect on those decision in light of your goals and values  Discuss your decisions with those closest to you and the healthcare professionals that care for you  Make a plan by creating a document that reflects your wishes    Surrogate Decision Maker  In the event of a medical emergency, which has left you unable to communicate or to make your own decisions, you would need someone to make decisions for you.  It is important to discuss your preferences for medical treatment with this person while you are in good health.     Qualities of a surrogate decision maker:  Willing to take on this role and responsibility  Knows what you want for future medical care  Willing to follow your wishes even if they don't agree with them  Able to make difficult medical decisions under stressful circumstances    Advance Directives  These are legal documents you can create that will guide your healthcare team and decision maker(s) when needed. These documents can be stored in the electronic medical record.    Living Will - a legal document to guide your care if you have a terminal condition or a serious illness and are unable to communicate. States vary by statute in document names/types, but most forms may include one or more of the following:        -  Directions regarding life-prolonging treatments        -  Directions regarding artificially provided nutrition/hydration        -  Choosing a healthcare decision maker        -  Direction regarding organ/tissue  donation    Durable Power of  for Healthcare - this document names an -in-fact to make medical decisions for you, but it may also allow this person to make personal and financial decisions for you. Please seek the advice of an  if you need this type of document.    **Advance Directives are not required and no one may discriminate against you if you do not sign one.    Medical Orders  Many states allow specific forms/orders signed by your physician to record your wishes for medical treatment in your current state of health. This form, signed in personal communication with your physician, addresses resuscitation and other medical interventions that you may or may not want.      For more information or to schedule a time with a [facility] Advance Care Planning Facilitator contact: [facility] or call [phone] and someone will contact you directly.    Medicare Wellness  Personal Prevention Plan of Service     Date of Office Visit:    Encounter Provider:  Sachin Edwards DO  Place of Service:  [facility]  Patient Name: Adolfo DELAROSA Gabriele Sanders  :  1950    As part of the Medicare Wellness portion of your visit today, we are providing you with this personalized preventive plan of services (PPPS). This plan is based upon recommendations of the [place] Preventive Services Task Force (USPSTF) and the Advisory Committee on Immunization Practices (ACIP).    This lists the preventive care services that should be considered, and provides dates of when you are due. Items listed as completed are up-to-date and do not require any further intervention.    Health Maintenance   Topic Date Due   • HEPATITIS C SCREENING  Never done   • COVID-19 Vaccine (2024- season) 2024   • RSV Vaccine - Adults (1 - 1-dose 75+ series) Never done   • ANNUAL WELLNESS VISIT  05/10/2025   • INFLUENZA VACCINE  2025   • TDAP/TD VACCINES (2 - Td or Tdap)  04/20/2028   • COLORECTAL CANCER SCREENING  08/15/2031   • Pneumococcal Vaccine 50+  Completed   • AAA SCREEN ONCE  Completed   • ZOSTER VACCINE  Completed       No orders of the defined types were placed in this encounter.      No follow-ups on file.

## 2025-05-23 ENCOUNTER — OFFICE VISIT (OUTPATIENT)
Age: 75
End: 2025-05-23
Payer: MEDICARE

## 2025-05-23 VITALS
OXYGEN SATURATION: 96 % | SYSTOLIC BLOOD PRESSURE: 141 MMHG | HEART RATE: 48 BPM | HEIGHT: 70 IN | BODY MASS INDEX: 31.92 KG/M2 | DIASTOLIC BLOOD PRESSURE: 73 MMHG | WEIGHT: 223 LBS | TEMPERATURE: 98.3 F

## 2025-05-23 DIAGNOSIS — H60.501 ACUTE OTITIS EXTERNA OF RIGHT EAR, UNSPECIFIED TYPE: Primary | ICD-10-CM

## 2025-05-23 RX ORDER — NEOMYCIN SULFATE, POLYMYXIN B SULFATE, HYDROCORTISONE 3.5; 10000; 1 MG/ML; [USP'U]/ML; MG/ML
3 SOLUTION/ DROPS AURICULAR (OTIC) 4 TIMES DAILY
Qty: 10 ML | Refills: 0 | Status: SHIPPED | OUTPATIENT
Start: 2025-05-23

## 2025-05-23 NOTE — PROGRESS NOTES
Follow Up Office Visit      Date: 2025   Patient Name: Adolfo Suazo Jr.  : 1950   MRN: [id number]     Chief Complaint:    Chief Complaint   Patient presents with    Earache     Right ear pain       History of Present Illness: Adolfo Suazo Jr. is a 74 y.o. male who is here today for right ear pain.  States that it has been going on for about a week.    Subjective      Review of Systems:   Review of Systems   Constitutional:  Negative for appetite change and unexpected weight loss.   HENT:  Negative for trouble swallowing.    Eyes:  Negative for blurred vision and double vision.   Respiratory:  Negative for cough and shortness of breath.    Cardiovascular:  Negative for chest pain and leg swelling.   Gastrointestinal:  Negative for blood in stool.   Endocrine: Negative for cold intolerance, heat intolerance and polyuria.   Musculoskeletal:  Negative for joint swelling.   Skin:  Negative for color change and bruise.   Neurological:  Negative for numbness and memory problem.   Hematological:  Does not bruise/bleed easily.   Psychiatric/Behavioral:  Negative for suicidal ideas and depressed mood. The patient is not nervous/anxious.        I have reviewed the patients family history, social history, past medical history, past surgical history and have updated it as appropriate.     Medications:     Current Outpatient Medications:     ketoconazole (NIZORAL) 2 % cream, APPLY A THIN LAYER OF CREAM WITH A Q-TIP TOPICALLY TWICE DAILY TO THE EARS AS NEEDED FOR FLAKING, Disp: , Rfl:     losartan (COZAAR) 25 MG tablet, Take 1 tablet by mouth Daily., Disp: 90 tablet, Rfl: 3    meloxicam (Mobic) 7.5 MG tablet, Take 1 tablet by mouth Daily., Disp: 90 tablet, Rfl: 3    methocarbamol (ROBAXIN) 500 MG tablet, Take 1 tablet by mouth 3 (Three) Times a Day As Needed for Muscle Spasms., Disp: 180 tablet, Rfl: 3    metoprolol succinate XL (TOPROL-XL) 25 MG 24 hr tablet, Take 1 tablet by mouth once daily, Disp: 90  "tablet, Rfl: 3    simvastatin (ZOCOR) 40 MG tablet, Take 1 tablet by mouth every night at bedtime., Disp: 90 tablet, Rfl: 3    neomycin-polymyxin-hydrocortisone (CORTISPORIN) 3.5-43328-1 otic solution, Administer 3 drops to the right ear 4 (Four) Times a Day., Disp: 10 mL, Rfl: 0    Allergies:   No Known Allergies    Objective     Physical Exam: Please see above  Vital Signs:   Vitals:    05/23/25 0934   BP: 141/73   Pulse: (!) 48   Temp: 98.3 °F (36.8 °C)   SpO2: 96%   Weight: 101 kg (223 lb)   Height: 177.8 cm (70\")     Body mass index is 32 kg/m².    Physical Exam  Vitals and nursing note reviewed.   Constitutional:       Appearance: Normal appearance.   HENT:      Head: Normocephalic and atraumatic.      Right Ear: Drainage and swelling present.   Eyes:      General: Lids are normal.      Conjunctiva/sclera: Conjunctivae normal.   Cardiovascular:      Rate and Rhythm: Normal rate and regular rhythm.   Pulmonary:      Effort: Pulmonary effort is normal.      Breath sounds: Normal breath sounds and air entry.   Abdominal:      General: Abdomen is flat. Bowel sounds are normal.      Palpations: Abdomen is soft.   Musculoskeletal:      Cervical back: Full passive range of motion without pain and normal range of motion.   Neurological:      General: No focal deficit present.      Mental Status: He is alert and oriented to person, place, and time.   Psychiatric:         Attention and Perception: Attention normal.         Mood and Affect: Mood normal.         Behavior: Behavior normal. Behavior is cooperative.         Procedures    Results:   Labs:   Hemoglobin A1C   Date Value Ref Range Status   01/14/2025 5.6 4.5 - 5.7 % Final     TSH   Date Value Ref Range Status   10/26/2022 1.570 0.270 - 4.200 uIU/mL Final        POCT Results (if applicable):   Results for orders placed or performed in visit on 05/19/25   Comprehensive metabolic panel    Collection Time: 05/19/25 10:09 AM    Specimen: Arm, Right; Blood   Result " Value Ref Range    Glucose 91 65 - 99 mg/dL    BUN 12 8 - 23 mg/dL    Creatinine 0.91 0.76 - 1.27 mg/dL    Sodium 139 136 - 145 mmol/L    Potassium 4.4 3.5 - 5.2 mmol/L    Chloride 106 98 - 107 mmol/L    CO2 23.0 22.0 - 29.0 mmol/L    Calcium 9.4 8.6 - 10.5 mg/dL    Total Protein 5.9 (L) 6.0 - 8.5 g/dL    Albumin 4.2 3.5 - 5.2 g/dL    ALT (SGPT) 32 1 - 41 U/L    AST (SGOT) 34 1 - 40 U/L    Alkaline Phosphatase 82 39 - 117 U/L    Total Bilirubin 1.2 0.0 - 1.2 mg/dL    Globulin 1.7 gm/dL    A/G Ratio 2.5 g/dL    BUN/Creatinine Ratio 13.2 7.0 - 25.0    Anion Gap 10.0 5.0 - 15.0 mmol/L    eGFR 87.9 >60.0 mL/min/1.73   Lipid panel    Collection Time: 05/19/25 10:09 AM    Specimen: Arm, Right; Blood   Result Value Ref Range    Total Cholesterol 133 0 - 200 mg/dL    Triglycerides 57 0 - 150 mg/dL    HDL Cholesterol 51 40 - 60 mg/dL    LDL Cholesterol  70 0 - 100 mg/dL    VLDL Cholesterol 12 5 - 40 mg/dL    LDL/HDL Ratio 1.38    PSA Screen    Collection Time: 05/19/25 10:09 AM    Specimen: Arm, Right; Blood   Result Value Ref Range    PSA 3.850 0.000 - 4.000 ng/mL   CBC Auto Differential    Collection Time: 05/19/25 10:09 AM    Specimen: Arm, Right; Blood   Result Value Ref Range    WBC 8.54 3.40 - 10.80 10*3/mm3    RBC 5.04 4.14 - 5.80 10*6/mm3    Hemoglobin 16.2 13.0 - 17.7 g/dL    Hematocrit 47.8 37.5 - 51.0 %    MCV 94.8 79.0 - 97.0 fL    MCH 32.1 26.6 - 33.0 pg    MCHC 33.9 31.5 - 35.7 g/dL    RDW 12.3 12.3 - 15.4 %    RDW-SD 42.8 37.0 - 54.0 fl    MPV 12.2 (H) 6.0 - 12.0 fL    Platelets 172 140 - 450 10*3/mm3    Neutrophil % 60.0 42.7 - 76.0 %    Lymphocyte % 29.5 19.6 - 45.3 %    Monocyte % 7.0 5.0 - 12.0 %    Eosinophil % 2.3 0.3 - 6.2 %    Basophil % 0.8 0.0 - 1.5 %    Immature Grans % 0.4 0.0 - 0.5 %    Neutrophils, Absolute 5.12 1.70 - 7.00 10*3/mm3    Lymphocytes, Absolute 2.52 0.70 - 3.10 10*3/mm3    Monocytes, Absolute 0.60 0.10 - 0.90 10*3/mm3    Eosinophils, Absolute 0.20 0.00 - 0.40 10*3/mm3    Basophils,  Absolute 0.07 0.00 - 0.20 10*3/mm3    Immature Grans, Absolute 0.03 0.00 - 0.05 10*3/mm3    nRBC 0.0 0.0 - 0.2 /100 WBC       Imaging:   No valid procedures specified.         Assessment / Plan      Assessment/Plan:   Diagnoses and all orders for this visit:    1. Acute otitis externa of right ear, unspecified type (Primary)  -     neomycin-polymyxin-hydrocortisone (CORTISPORIN) 3.5-85148-7 otic solution; Administer 3 drops to the right ear 4 (Four) Times a Day.  Dispense: 10 mL; Refill: 0            Vaccine Counseling:      Follow Up:   No follow-ups on file.        Joe Zamarripa,   [address]

## 2025-05-29 ENCOUNTER — TELEPHONE (OUTPATIENT)
Age: 75
End: 2025-05-29

## 2025-05-29 NOTE — TELEPHONE ENCOUNTER
"  Caller: Adolfo Suazo Jr. \"Bill\"    Relationship: Self    Best call back number: [phone]     Which medication are you concerned about: neomycin-polymyxin-hydrocortisone (CORTISPORIN) 3.5-83077-4 otic solution     Who prescribed you this medication: DR MAGALLON    When did you start taking this medication: 05-23-25    What are your concerns: PT STATED THE MEDICATION HAS HELPED. PT HAS USED MEDICATION FOR 5 DAYS. PT WANTS TO KNOW HOW LONG HE NEEDS TO USE THE MEDICATION? OKAY TO ANSWER IN MY CHART.    "

## 2025-05-29 NOTE — TELEPHONE ENCOUNTER
"  Caller: Adolfo Suazo Jr. \"Bill\"    Relationship: Self    Best call back number: [phone]     Caller requesting test results: PT    What test was performed: BLOOD WORK    When was the test performed: 05-19-25    Where was the test performed: OFFICE    Additional notes: PT WOULD LIKE A CALL TO DISCUSS THE TEST RESULTS. PT STATED HE DIDN'T NOTICE AN A1C READING. PT WANTED TO KNOW IF THAT TEST WAS DONE.    "

## 2025-08-16 DIAGNOSIS — I10 ESSENTIAL HYPERTENSION: Chronic | ICD-10-CM

## 2025-08-18 RX ORDER — SIMVASTATIN 40 MG
40 TABLET ORAL
Qty: 90 TABLET | Refills: 0 | Status: SHIPPED | OUTPATIENT
Start: 2025-08-18

## 2025-08-18 RX ORDER — LOSARTAN POTASSIUM 25 MG/1
25 TABLET ORAL DAILY
Qty: 90 TABLET | Refills: 0 | Status: SHIPPED | OUTPATIENT
Start: 2025-08-18

## 2025-08-21 ENCOUNTER — EXTERNAL PBMM DATA (OUTPATIENT)
Dept: PHARMACY | Facility: OTHER | Age: 75
End: 2025-08-21
Payer: MEDICARE

## 2025-08-29 ENCOUNTER — POP HEALTH PHARMACY (OUTPATIENT)
Dept: PHARMACY | Facility: OTHER | Age: 75
End: 2025-08-29
Payer: MEDICARE

## (undated) DEVICE — Device

## (undated) DEVICE — HYBRID CO2 TUBING/CAP SET FOR OLYMPUS® SCOPES & CO2 SOURCE: Brand: ERBE

## (undated) DEVICE — QUICK CATCH IN-LINE SUCTION POLYP TRAP IS USED FOR SUCTION RETRIEVAL OF ENDOSCOPICALLY REMOVED POLYPS.

## (undated) DEVICE — FRCP BX RADJAW4 NDL 2.8 240 STD OG

## (undated) DEVICE — ENDOSCOPY PORT CONNECTOR FOR OLYMPUS® SCOPES: Brand: ERBE

## (undated) DEVICE — SINGLE-USE POLYPECTOMY SNARE: Brand: CAPTIVATOR II

## (undated) DEVICE — LUBE JELLY PK/2.75GM STRL BX/144

## (undated) DEVICE — VLV SXN AIR/H2O ORCAPOD3 1P/U STRL